# Patient Record
Sex: FEMALE | Race: BLACK OR AFRICAN AMERICAN | Employment: FULL TIME | URBAN - METROPOLITAN AREA
[De-identification: names, ages, dates, MRNs, and addresses within clinical notes are randomized per-mention and may not be internally consistent; named-entity substitution may affect disease eponyms.]

---

## 2017-07-06 ENCOUNTER — OFFICE VISIT (OUTPATIENT)
Dept: FAMILY MEDICINE CLINIC | Facility: CLINIC | Age: 32
End: 2017-07-06

## 2017-07-06 VITALS
TEMPERATURE: 98 F | DIASTOLIC BLOOD PRESSURE: 78 MMHG | HEIGHT: 61 IN | HEART RATE: 89 BPM | BODY MASS INDEX: 45.12 KG/M2 | WEIGHT: 239 LBS | SYSTOLIC BLOOD PRESSURE: 115 MMHG

## 2017-07-06 DIAGNOSIS — E66.01 MORBID OBESITY WITH BMI OF 45.0-49.9, ADULT (HCC): ICD-10-CM

## 2017-07-06 DIAGNOSIS — Z00.00 WELL ADULT EXAM: Primary | ICD-10-CM

## 2017-07-06 PROCEDURE — 99385 PREV VISIT NEW AGE 18-39: CPT | Performed by: FAMILY MEDICINE

## 2017-07-06 NOTE — PROGRESS NOTES
HPI:   Ferman Saint is a 34year old female who presents for a complete physical exam. Symptoms: periods are regular. Wt Readings from Last 6 Encounters:  07/06/17 : 239 lb (108.4 kg)    Body mass index is 45.16 kg/m².      No results found for: CHOLEST, 98.3 °F (36.8 °C) (Oral)   Ht 5' 1\" (1.549 m)   Wt 239 lb (108.4 kg)   LMP 06/14/2017 (Exact Date)   BMI 45.16 kg/m²   Body mass index is 45.16 kg/m².    GENERAL: well developed, well nourished,in no apparent distress  SKIN: no rashes,no suspicious lesions content. Recommend exercising at least 30-40 minutes 5-6 days a week. Avoid skipping meals. Making healthy choices for snacks and also limiting sugary beverages.      - HEMOGLOBIN A1C; Future  - DIETITIAN EDUCATION INITIAL, DIET (INTERNAL)      Kenney DACOSTA

## 2017-07-11 ENCOUNTER — LAB ENCOUNTER (OUTPATIENT)
Dept: LAB | Age: 32
End: 2017-07-11
Attending: FAMILY MEDICINE
Payer: MEDICAID

## 2017-07-11 DIAGNOSIS — Z00.00 WELL ADULT EXAM: ICD-10-CM

## 2017-07-11 DIAGNOSIS — E66.01 MORBID OBESITY WITH BMI OF 45.0-49.9, ADULT (HCC): ICD-10-CM

## 2017-07-11 LAB
ALT SERPL-CCNC: 12 U/L (ref 14–54)
ANION GAP SERPL CALC-SCNC: 5 MMOL/L (ref 0–18)
AST SERPL-CCNC: 16 U/L (ref 15–41)
BASOPHILS # BLD: 0.1 K/UL (ref 0–0.2)
BASOPHILS NFR BLD: 1 %
BUN SERPL-MCNC: 10 MG/DL (ref 8–20)
BUN/CREAT SERPL: 13.7 (ref 10–20)
CALCIUM SERPL-MCNC: 9 MG/DL (ref 8.5–10.5)
CHLORIDE SERPL-SCNC: 107 MMOL/L (ref 95–110)
CHOLEST SERPL-MCNC: 118 MG/DL (ref 110–200)
CO2 SERPL-SCNC: 26 MMOL/L (ref 22–32)
CREAT SERPL-MCNC: 0.73 MG/DL (ref 0.5–1.5)
EOSINOPHIL # BLD: 0.2 K/UL (ref 0–0.7)
EOSINOPHIL NFR BLD: 4 %
ERYTHROCYTE [DISTWIDTH] IN BLOOD BY AUTOMATED COUNT: 14.5 % (ref 11–15)
GLUCOSE SERPL-MCNC: 93 MG/DL (ref 70–99)
HBA1C MFR BLD: 5.3 % (ref 4–6)
HCT VFR BLD AUTO: 40.8 % (ref 35–48)
HDLC SERPL-MCNC: 37 MG/DL
HGB BLD-MCNC: 13.6 G/DL (ref 12–16)
LDLC SERPL CALC-MCNC: 72 MG/DL (ref 0–99)
LYMPHOCYTES # BLD: 1.9 K/UL (ref 1–4)
LYMPHOCYTES NFR BLD: 34 %
MCH RBC QN AUTO: 30.6 PG (ref 27–32)
MCHC RBC AUTO-ENTMCNC: 33.4 G/DL (ref 32–37)
MCV RBC AUTO: 91.6 FL (ref 80–100)
MONOCYTES # BLD: 0.4 K/UL (ref 0–1)
MONOCYTES NFR BLD: 8 %
NEUTROPHILS # BLD AUTO: 3 K/UL (ref 1.8–7.7)
NEUTROPHILS NFR BLD: 53 %
NONHDLC SERPL-MCNC: 81 MG/DL
OSMOLALITY UR CALC.SUM OF ELEC: 285 MOSM/KG (ref 275–295)
PLATELET # BLD AUTO: 243 K/UL (ref 140–400)
PMV BLD AUTO: 10.5 FL (ref 7.4–10.3)
POTASSIUM SERPL-SCNC: 4.3 MMOL/L (ref 3.3–5.1)
RBC # BLD AUTO: 4.46 M/UL (ref 3.7–5.4)
SODIUM SERPL-SCNC: 138 MMOL/L (ref 136–144)
TRIGL SERPL-MCNC: 44 MG/DL (ref 1–149)
TSH SERPL-ACNC: 1.55 UIU/ML (ref 0.45–5.33)
WBC # BLD AUTO: 5.6 K/UL (ref 4–11)

## 2017-07-11 PROCEDURE — 84450 TRANSFERASE (AST) (SGOT): CPT

## 2017-07-11 PROCEDURE — 85025 COMPLETE CBC W/AUTO DIFF WBC: CPT

## 2017-07-11 PROCEDURE — 82306 VITAMIN D 25 HYDROXY: CPT

## 2017-07-11 PROCEDURE — 80061 LIPID PANEL: CPT

## 2017-07-11 PROCEDURE — 36415 COLL VENOUS BLD VENIPUNCTURE: CPT

## 2017-07-11 PROCEDURE — 84443 ASSAY THYROID STIM HORMONE: CPT

## 2017-07-11 PROCEDURE — 84460 ALANINE AMINO (ALT) (SGPT): CPT

## 2017-07-11 PROCEDURE — 83036 HEMOGLOBIN GLYCOSYLATED A1C: CPT

## 2017-07-11 PROCEDURE — 80048 BASIC METABOLIC PNL TOTAL CA: CPT

## 2017-07-12 LAB — 25(OH)D3 SERPL-MCNC: 14.4 NG/ML

## 2017-07-21 RX ORDER — ERGOCALCIFEROL 1.25 MG/1
50000 CAPSULE ORAL WEEKLY
Qty: 12 CAPSULE | Refills: 0 | Status: SHIPPED | OUTPATIENT
Start: 2017-07-21 | End: 2017-10-07

## 2019-04-07 ENCOUNTER — HOSPITAL ENCOUNTER (OUTPATIENT)
Age: 34
Discharge: HOME | End: 2019-04-07
Attending: FAMILY MEDICINE
Payer: MEDICAID

## 2019-04-07 VITALS
OXYGEN SATURATION: 100 % | HEART RATE: 81 BPM | BODY MASS INDEX: 43 KG/M2 | TEMPERATURE: 99 F | RESPIRATION RATE: 16 BRPM | WEIGHT: 230 LBS | SYSTOLIC BLOOD PRESSURE: 132 MMHG | DIASTOLIC BLOOD PRESSURE: 81 MMHG

## 2019-04-07 DIAGNOSIS — J02.0 STREPTOCOCCAL SORE THROAT: Primary | ICD-10-CM

## 2019-04-07 PROCEDURE — 99204 OFFICE O/P NEW MOD 45 MIN: CPT

## 2019-04-07 PROCEDURE — 87430 STREP A AG IA: CPT

## 2019-04-07 PROCEDURE — 99213 OFFICE O/P EST LOW 20 MIN: CPT

## 2019-04-07 RX ORDER — IBUPROFEN 800 MG/1
800 TABLET ORAL EVERY 8 HOURS PRN
Qty: 30 TABLET | Refills: 0 | Status: SHIPPED | OUTPATIENT
Start: 2019-04-07 | End: 2019-04-14

## 2019-04-07 RX ORDER — AMOXICILLIN 875 MG/1
875 TABLET, COATED ORAL 2 TIMES DAILY
Qty: 20 TABLET | Refills: 0 | Status: SHIPPED | OUTPATIENT
Start: 2019-04-07 | End: 2019-04-17

## 2019-04-07 NOTE — ED PROVIDER NOTES
Patient presents with:  Ear Problem Pain (neurosensory)      HPI:     Shleby Alcocer is a 27year old female who presents with for chief complaint of nasal congestion, sore throat, fatigue, left ear pain  X 2 days.     The patient denies complaints of reactive to light. No scleral icterus. Neck: Normal range of motion. Neck supple. No JVD present. No tracheal deviation. Mild anterior cervical adenopathy present. No thyromegaly present.    Cardiovascular: Normal rate, regular rhythm and intact distal instructions for your condition today.     Follow Up with:  Usman Le, [facility] [address]  [phone]    In 1 week  IF NOT BETTER

## 2020-01-06 NOTE — ADDENDUM NOTE
Addended by: Genna Owens on: 7/21/2017 03:19 PM     Modules accepted: Orders No pallor, no cervical/supraclavicular/inguinal adenopathy.  No splenomegaly

## 2020-04-15 ENCOUNTER — TELEPHONE (OUTPATIENT)
Dept: FAMILY MEDICINE CLINIC | Facility: CLINIC | Age: 35
End: 2020-04-15

## 2020-04-15 NOTE — TELEPHONE ENCOUNTER
Flu yes annually but not the pneumonia vaccine, that's for people over 72 or younger than 72 only if they have heart disease or other diabetic or have any lung issues such as COPD

## 2020-04-15 NOTE — TELEPHONE ENCOUNTER
Dr. Gila Daly, [facility] patient is due for Pneumococcal PPSV23 Medium Risk Adult and Influenza. Please advise.

## 2020-04-15 NOTE — TELEPHONE ENCOUNTER
----- Message from Irineo Alcocer sent at 4/15/2020  3:20 PM CDT -----  Regarding: Non-Urgent Medical Question  Contact: [phone]  Could you look in my to do list and tell me what it this vaccine that it is saying I'm over do for because I don't know

## 2020-04-16 NOTE — TELEPHONE ENCOUNTER
Autumn Alcocer Robe  You 30 minutes ago (9:44 AM)         Sharmila Edwards thank you it had got me kind of scared because I didn't know what it was.

## 2020-04-21 ENCOUNTER — NURSE TRIAGE (OUTPATIENT)
Dept: FAMILY MEDICINE CLINIC | Facility: CLINIC | Age: 35
End: 2020-04-21

## 2020-04-21 NOTE — TELEPHONE ENCOUNTER
Action Requested: Summary for Provider     []  Critical Lab, Recommendations Needed  [] Need Additional Advice  [x]   FYI    []   Need Orders  [] Need Medications Sent to Pharmacy  []  Other     SUMMARY: per patient she had a \"small headache\" and she rudolph

## 2020-04-21 NOTE — TELEPHONE ENCOUNTER
Please patient please place patient on my schedule for a telemetry visit ( video/ audio) to review blood pressure next week. In the meantime have patient check her blood pressures on a daily basis. Maintain a low-sodium diet.  1500 mg daily total

## 2020-04-25 ENCOUNTER — PATIENT MESSAGE (OUTPATIENT)
Dept: FAMILY MEDICINE CLINIC | Facility: CLINIC | Age: 35
End: 2020-04-25

## 2020-04-26 ENCOUNTER — TELEPHONE (OUTPATIENT)
Dept: FAMILY MEDICINE CLINIC | Facility: CLINIC | Age: 35
End: 2020-04-26

## 2020-04-26 NOTE — TELEPHONE ENCOUNTER
From: Karen Alcocer  To: 59 Axel Blood MD  Sent: 4/25/2020  1:33 PM CDT  Subject: Other    Hello I woke up this morning feeling very dizzy and that was at 8 am and now it's 1:30 this afternoon and I'm still dizzy my blood pressure was 136/98 at 10am

## 2020-04-26 NOTE — TELEPHONE ENCOUNTER
RN pls call pt and triage, thanks.      Future Appointments   Date Time Provider Juan M Brenner   4/29/2020 11:00 AM Maryana Gifford MD [facility]

## 2020-04-26 NOTE — TELEPHONE ENCOUNTER
From: Leah Alcocer  To: 59 Axel Blood MD  Sent: 4/25/2020 1:33 PM CDT  Subject: Other    Hello I woke up this morning feeling very dizzy and that was at 8 am and now it's 1:30 this afternoon and I'm still dizzy my blood pressure was 136/98 at 10am w

## 2020-04-27 NOTE — TELEPHONE ENCOUNTER
Future Appointments   Date Time Provider Juan M Brenner   4/29/2020 11:00 AM Leena Millan MD [facility] Teddy Pyle

## 2020-04-27 NOTE — TELEPHONE ENCOUNTER
Dr Rudy Reynaga responded in original MyChart encounter that had been routed to her:    Ceci Islas MD  Em Rn Triage 4 hours ago (8:22 AM)         Please schedule virtual visit   Please have her check bps daily         Documentation      RN please contact pt reg

## 2020-04-29 ENCOUNTER — TELEMEDICINE (OUTPATIENT)
Dept: FAMILY MEDICINE CLINIC | Facility: CLINIC | Age: 35
End: 2020-04-29
Payer: MEDICAID

## 2020-04-29 VITALS
HEART RATE: 87 BPM | TEMPERATURE: 98 F | HEIGHT: 61 IN | WEIGHT: 259 LBS | BODY MASS INDEX: 48.9 KG/M2 | DIASTOLIC BLOOD PRESSURE: 89 MMHG | SYSTOLIC BLOOD PRESSURE: 112 MMHG

## 2020-04-29 DIAGNOSIS — R03.0 ELEVATED BLOOD PRESSURE READING: Primary | ICD-10-CM

## 2020-04-29 DIAGNOSIS — E66.01 MORBID OBESITY WITH BMI OF 45.0-49.9, ADULT (HCC): ICD-10-CM

## 2020-04-29 DIAGNOSIS — R42 DIZZINESS: ICD-10-CM

## 2020-04-29 PROCEDURE — 99214 OFFICE O/P EST MOD 30 MIN: CPT | Performed by: FAMILY MEDICINE

## 2020-04-29 NOTE — PROGRESS NOTES
This visit is conducted using Telemedicine with live, interactive video and audio during this Coronavirus pandemic. Please note that the following visit was completed using two-way, real-time interactive audio and/or video communication.   This has been arms/ legs        ROS  A comprehensive 10 point review of systems was completed. Pertinent positives and negatives noted in the the HPI. PATIENTS DENIES ANY KNOWN EXPOSURE TO ANYONE CONFIRMED FOR COVID 19. History reviewed.  No pertinent past m weeks  - COMP METABOLIC PANEL (14); Future  - LIPID PANEL; Future  - HEMOGLOBIN A1C; Future    3. Dizziness  Resolved at this time. Check blood work. If symptoms recur please call back. Patient verbalized intending of plan.   Recommend increasing water i

## 2020-04-30 ENCOUNTER — LAB ENCOUNTER (OUTPATIENT)
Dept: LAB | Age: 35
End: 2020-04-30
Attending: FAMILY MEDICINE
Payer: MEDICAID

## 2020-04-30 DIAGNOSIS — R42 DIZZINESS: ICD-10-CM

## 2020-04-30 DIAGNOSIS — E66.01 MORBID OBESITY WITH BMI OF 45.0-49.9, ADULT (HCC): ICD-10-CM

## 2020-04-30 PROCEDURE — 80053 COMPREHEN METABOLIC PANEL: CPT

## 2020-04-30 PROCEDURE — 83036 HEMOGLOBIN GLYCOSYLATED A1C: CPT

## 2020-04-30 PROCEDURE — 85025 COMPLETE CBC W/AUTO DIFF WBC: CPT

## 2020-04-30 PROCEDURE — 36415 COLL VENOUS BLD VENIPUNCTURE: CPT

## 2020-04-30 PROCEDURE — 80061 LIPID PANEL: CPT

## 2020-05-06 ENCOUNTER — APPOINTMENT (OUTPATIENT)
Dept: LAB | Age: 35
End: 2020-05-06
Attending: FAMILY MEDICINE
Payer: MEDICAID

## 2020-05-06 ENCOUNTER — OFFICE VISIT (OUTPATIENT)
Dept: FAMILY MEDICINE CLINIC | Facility: CLINIC | Age: 35
End: 2020-05-06
Payer: MEDICAID

## 2020-05-06 VITALS
WEIGHT: 254 LBS | HEART RATE: 72 BPM | RESPIRATION RATE: 16 BRPM | TEMPERATURE: 99 F | BODY MASS INDEX: 47.95 KG/M2 | SYSTOLIC BLOOD PRESSURE: 128 MMHG | DIASTOLIC BLOOD PRESSURE: 70 MMHG | HEIGHT: 61 IN

## 2020-05-06 DIAGNOSIS — E66.01 MORBID OBESITY WITH BMI OF 45.0-49.9, ADULT (HCC): Primary | ICD-10-CM

## 2020-05-06 DIAGNOSIS — R03.0 ELEVATED BLOOD PRESSURE READING: ICD-10-CM

## 2020-05-06 DIAGNOSIS — Z00.00 WELL ADULT EXAM: ICD-10-CM

## 2020-05-06 PROCEDURE — 99214 OFFICE O/P EST MOD 30 MIN: CPT | Performed by: FAMILY MEDICINE

## 2020-05-06 PROCEDURE — 36415 COLL VENOUS BLD VENIPUNCTURE: CPT

## 2020-05-06 PROCEDURE — 84443 ASSAY THYROID STIM HORMONE: CPT

## 2020-05-06 PROCEDURE — 82306 VITAMIN D 25 HYDROXY: CPT

## 2020-05-06 NOTE — PROGRESS NOTES
HPI:    Patient ID: Shad Alarcon is a 38year old female.     HPI  Patient presents with:  Hypertension: Patient present for BP check    Taking bps at home  Readings at home 122-128/70-82    No chest pain, shortness of breath  Gets slightly dizzy in ASSESSMENT/PLAN:   Morbid obesity with bmi of 45.0-49.9, adult (hcc)  (primary encounter diagnosis)  Elevated blood pressure reading  Well adult exam    1. Morbid obesity with BMI of 45.0-49.9, adult ([address]  Highly recommend to lose weight.   Butch Crest

## 2020-06-29 ENCOUNTER — OFFICE VISIT (OUTPATIENT)
Dept: FAMILY MEDICINE CLINIC | Facility: CLINIC | Age: 35
End: 2020-06-29
Payer: MEDICAID

## 2020-06-29 VITALS
DIASTOLIC BLOOD PRESSURE: 82 MMHG | TEMPERATURE: 100 F | HEART RATE: 82 BPM | SYSTOLIC BLOOD PRESSURE: 119 MMHG | BODY MASS INDEX: 47.39 KG/M2 | HEIGHT: 61 IN | WEIGHT: 251 LBS

## 2020-06-29 DIAGNOSIS — F17.210 LIGHT CIGARETTE SMOKER: ICD-10-CM

## 2020-06-29 DIAGNOSIS — E55.9 VITAMIN D DEFICIENCY: ICD-10-CM

## 2020-06-29 DIAGNOSIS — R74.8 ELEVATED ALKALINE PHOSPHATASE LEVEL: ICD-10-CM

## 2020-06-29 DIAGNOSIS — E66.01 MORBID OBESITY WITH BMI OF 45.0-49.9, ADULT (HCC): ICD-10-CM

## 2020-06-29 DIAGNOSIS — F12.90 MARIJUANA USE: ICD-10-CM

## 2020-06-29 DIAGNOSIS — Z00.00 WELL ADULT EXAM: Primary | ICD-10-CM

## 2020-06-29 DIAGNOSIS — Z01.419 ENCOUNTER FOR GYNECOLOGICAL EXAMINATION: ICD-10-CM

## 2020-06-29 PROCEDURE — 99395 PREV VISIT EST AGE 18-39: CPT | Performed by: FAMILY MEDICINE

## 2020-07-02 LAB — HPV I/H RISK 1 DNA SPEC QL NAA+PROBE: NEGATIVE

## 2020-07-03 LAB
LAST PAP RESULT: NORMAL
PAP HISTORY (OTHER THAN LAST PAP): NORMAL

## 2020-10-12 ENCOUNTER — OFFICE VISIT (OUTPATIENT)
Dept: FAMILY MEDICINE CLINIC | Facility: CLINIC | Age: 35
End: 2020-10-12
Payer: MEDICAID

## 2020-10-12 ENCOUNTER — TELEPHONE (OUTPATIENT)
Dept: OBGYN CLINIC | Facility: CLINIC | Age: 35
End: 2020-10-12

## 2020-10-12 VITALS
TEMPERATURE: 98 F | HEART RATE: 96 BPM | WEIGHT: 254 LBS | SYSTOLIC BLOOD PRESSURE: 120 MMHG | DIASTOLIC BLOOD PRESSURE: 76 MMHG | HEIGHT: 61 IN | BODY MASS INDEX: 47.95 KG/M2

## 2020-10-12 DIAGNOSIS — N91.2 AMENORRHEA: Primary | ICD-10-CM

## 2020-10-12 DIAGNOSIS — Z32.01 PREGNANCY EXAMINATION OR TEST, POSITIVE RESULT: ICD-10-CM

## 2020-10-12 PROCEDURE — 81025 URINE PREGNANCY TEST: CPT | Performed by: FAMILY MEDICINE

## 2020-10-12 PROCEDURE — 3008F BODY MASS INDEX DOCD: CPT | Performed by: FAMILY MEDICINE

## 2020-10-12 PROCEDURE — 3074F SYST BP LT 130 MM HG: CPT | Performed by: FAMILY MEDICINE

## 2020-10-12 PROCEDURE — 99213 OFFICE O/P EST LOW 20 MIN: CPT | Performed by: FAMILY MEDICINE

## 2020-10-12 PROCEDURE — 3078F DIAST BP <80 MM HG: CPT | Performed by: FAMILY MEDICINE

## 2020-10-12 NOTE — PROGRESS NOTES
HPI:    Patient ID: Shad Alarcon is a 38year old female. HPI  Patient presents with:  Pregnancy: pt states she took a test at home and was positive     Patient states she cleared her menstrual cycle.   She did a home pregnancy test at home 2 days positive result  Recommend starting prenatal vitamins.   Follow-up with OB GYN.  - OBG - INTERNAL      Orders Placed This Encounter      POC Urine pregnancy test [05162]      Meds This Visit:  Requested Prescriptions      No prescriptions requested or order

## 2020-10-12 NOTE — TELEPHONE ENCOUNTER
Confirms LMP 9/9. 4w5d confirms regular cycles every 28 days lasting for 5 days. Advised to start PNV with DHA and FA right away. Advised of rotating male and female practice. Advised first appointment is with nurse OBN.  Pt is comfortable coming in for a O

## 2020-11-02 ENCOUNTER — NURSE ONLY (OUTPATIENT)
Dept: OBGYN CLINIC | Facility: CLINIC | Age: 35
End: 2020-11-02
Payer: MEDICAID

## 2020-11-02 VITALS — HEIGHT: 61 IN | WEIGHT: 250 LBS | BODY MASS INDEX: 47.2 KG/M2

## 2020-11-02 DIAGNOSIS — Z34.81 ENCOUNTER FOR SUPERVISION OF OTHER NORMAL PREGNANCY IN FIRST TRIMESTER: Primary | ICD-10-CM

## 2020-11-02 PROCEDURE — 3008F BODY MASS INDEX DOCD: CPT | Performed by: OBSTETRICS & GYNECOLOGY

## 2020-11-02 NOTE — PROGRESS NOTES
Pt had OBN appt today with no complaints. Normal PN labs ordered plus 1 hr gtt, sickle cell, and hep c. Pt advised all labs must be completed and resulted prior to MD appt. Pt aware she needs appt for lab work. Pt accepted new ob appt with JENARO on 11/16. Yes-childhood     Recent Travel (or planned travel) to [facility] for self and or partner No    Pets No        GENETICS SCREENING    Genetic Screening    Genetic Screening/Teratology Counseling- Includes patient, baby's father, or anyone in either family wi

## 2020-11-04 ENCOUNTER — LAB ENCOUNTER (OUTPATIENT)
Dept: LAB | Facility: HOSPITAL | Age: 35
End: 2020-11-04
Attending: OBSTETRICS & GYNECOLOGY
Payer: MEDICAID

## 2020-11-04 DIAGNOSIS — R74.8 ELEVATED ALKALINE PHOSPHATASE LEVEL: ICD-10-CM

## 2020-11-04 DIAGNOSIS — Z34.81 ENCOUNTER FOR SUPERVISION OF OTHER NORMAL PREGNANCY IN FIRST TRIMESTER: ICD-10-CM

## 2020-11-04 PROCEDURE — 86780 TREPONEMA PALLIDUM: CPT

## 2020-11-04 PROCEDURE — 87389 HIV-1 AG W/HIV-1&-2 AB AG IA: CPT

## 2020-11-04 PROCEDURE — 80076 HEPATIC FUNCTION PANEL: CPT

## 2020-11-04 PROCEDURE — 85660 RBC SICKLE CELL TEST: CPT

## 2020-11-04 PROCEDURE — 86850 RBC ANTIBODY SCREEN: CPT

## 2020-11-04 PROCEDURE — 86803 HEPATITIS C AB TEST: CPT

## 2020-11-04 PROCEDURE — 85025 COMPLETE CBC W/AUTO DIFF WBC: CPT

## 2020-11-04 PROCEDURE — 36415 COLL VENOUS BLD VENIPUNCTURE: CPT

## 2020-11-04 PROCEDURE — 82950 GLUCOSE TEST: CPT

## 2020-11-04 PROCEDURE — 87086 URINE CULTURE/COLONY COUNT: CPT

## 2020-11-04 PROCEDURE — 87340 HEPATITIS B SURFACE AG IA: CPT

## 2020-11-04 PROCEDURE — 86762 RUBELLA ANTIBODY: CPT

## 2020-11-04 PROCEDURE — 86901 BLOOD TYPING SEROLOGIC RH(D): CPT

## 2020-11-04 PROCEDURE — 86900 BLOOD TYPING SEROLOGIC ABO: CPT

## 2020-11-16 ENCOUNTER — TELEPHONE (OUTPATIENT)
Dept: OBGYN CLINIC | Facility: CLINIC | Age: 35
End: 2020-11-16

## 2020-11-16 ENCOUNTER — INITIAL PRENATAL (OUTPATIENT)
Dept: OBGYN CLINIC | Facility: CLINIC | Age: 35
End: 2020-11-16
Payer: MEDICAID

## 2020-11-16 VITALS
WEIGHT: 252.63 LBS | DIASTOLIC BLOOD PRESSURE: 89 MMHG | SYSTOLIC BLOOD PRESSURE: 142 MMHG | BODY MASS INDEX: 48 KG/M2 | HEART RATE: 85 BPM

## 2020-11-16 DIAGNOSIS — E66.01 MORBID OBESITY WITH BMI OF 45.0-49.9, ADULT (HCC): ICD-10-CM

## 2020-11-16 DIAGNOSIS — Z34.91 ENCOUNTER FOR SUPERVISION OF NORMAL PREGNANCY IN FIRST TRIMESTER, UNSPECIFIED GRAVIDITY: Primary | ICD-10-CM

## 2020-11-16 DIAGNOSIS — O09.521 AMA (ADVANCED MATERNAL AGE) MULTIGRAVIDA 35+, FIRST TRIMESTER: Primary | ICD-10-CM

## 2020-11-16 DIAGNOSIS — Z36.9 FIRST TRIMESTER SCREENING: ICD-10-CM

## 2020-11-16 PROBLEM — Z13.79 GENETIC TESTING: Status: ACTIVE | Noted: 2020-11-16

## 2020-11-16 PROBLEM — O26.899 RH NEGATIVE STATE IN ANTEPARTUM PERIOD: Status: ACTIVE | Noted: 2020-11-16

## 2020-11-16 PROBLEM — Z67.91 RH NEGATIVE STATE IN ANTEPARTUM PERIOD: Status: ACTIVE | Noted: 2020-11-16

## 2020-11-16 PROBLEM — O26.899 RH NEGATIVE STATE IN ANTEPARTUM PERIOD (HCC): Status: ACTIVE | Noted: 2020-11-16

## 2020-11-16 PROBLEM — Z67.91 RH NEGATIVE STATE IN ANTEPARTUM PERIOD (HCC): Status: ACTIVE | Noted: 2020-11-16

## 2020-11-16 PROCEDURE — 87491 CHLMYD TRACH DNA AMP PROBE: CPT | Performed by: OBSTETRICS & GYNECOLOGY

## 2020-11-16 PROCEDURE — 81002 URINALYSIS NONAUTO W/O SCOPE: CPT | Performed by: OBSTETRICS & GYNECOLOGY

## 2020-11-16 PROCEDURE — 0500F INITIAL PRENATAL CARE VISIT: CPT | Performed by: OBSTETRICS & GYNECOLOGY

## 2020-11-16 PROCEDURE — 76815 OB US LIMITED FETUS(S): CPT | Performed by: OBSTETRICS & GYNECOLOGY

## 2020-11-16 PROCEDURE — 3079F DIAST BP 80-89 MM HG: CPT | Performed by: OBSTETRICS & GYNECOLOGY

## 2020-11-16 PROCEDURE — 87591 N.GONORRHOEAE DNA AMP PROB: CPT | Performed by: OBSTETRICS & GYNECOLOGY

## 2020-11-16 PROCEDURE — 90471 IMMUNIZATION ADMIN: CPT | Performed by: OBSTETRICS & GYNECOLOGY

## 2020-11-16 PROCEDURE — 90686 IIV4 VACC NO PRSV 0.5 ML IM: CPT | Performed by: OBSTETRICS & GYNECOLOGY

## 2020-11-16 PROCEDURE — 87661 TRICHOMONAS VAGINALIS AMPLIF: CPT | Performed by: OBSTETRICS & GYNECOLOGY

## 2020-11-16 PROCEDURE — 3077F SYST BP >= 140 MM HG: CPT | Performed by: OBSTETRICS & GYNECOLOGY

## 2020-11-16 NOTE — TELEPHONE ENCOUNTER
Wants genetics with MFM. Will need level 2, serial growth US, and NSTs for morbid obesity.   Also needs sleep study and maternal echocardiogram due to obesity

## 2020-11-16 NOTE — PROGRESS NOTES
New OB. Mild morning sickness resolves with breakfast.  No complaints. Wants genetics and flu shot. Repeat /80. Has BP cuff and asked to keep log and send weekly via nodila. Discussed need for M involvement due AMA and Morbid obesity.   Will n

## 2020-11-16 NOTE — TELEPHONE ENCOUNTER
Orders placed for M, stephy sent MFM number provided.      CPT 42980 has been approved  [id number]      Will route to RN pool to place order for maternal Echo and sleep study

## 2020-11-19 NOTE — PROGRESS NOTES
Outpatient Maternal-Fetal Medicine Consultation    Dear Dr. Dallas Mcgovern,    Thank you for requesting ultrasound evaluation and maternal fetal medicine consultation on your patient Andrew Alcocer.   As you are aware she is a 38year old female with a Single Comments: None    Past Medical History  The patient  has a past medical history of History of trichomoniasis, Marijuana use, Obesity, Class III, BMI 40-49.9 (morbid obesity) ([facility] [address] (2020), and Smoker.     Past Surgical History  The patient  has a past surgic diabetes  · Intrauterine fetal death    As a result, enhanced pregnancy surveillance is advised for these patients including a comprehensive ultrasound to assess for fetal malformations (at 20 weeks) and a third trimester ultrasound assessment for fetal gr death from 5.2 to 1.3 per 1000 pregnancies.  While a policy of antepartum testing in older women does increase the chance that a women will be induced (71 inductions per fetal death averted) and thereby increases her risk of having a  delivery, only of detailed mid-trimester sonography was reviewed with the patient. First trimester screening and second trimester multiple-marker serum serum screening as alternative aneuploidy screening options were also reviewed.  However, both of these tests are associ before or during pregnancy contribute to an increased probability of  delivery. It has also been hypothesized that obesity may lead to dystocia due to increased soft tissue deposition in the maternal pelvis.     delivery in the obese gravid per week, keeping HR<140 bpm.  I encouraged Ag Montez to begin moderate exercise such as walking or stationary bike in the pregnancy. She reports that she does snore. I advised that she get a sleep study.   She has been given that advice from Dr. Jennifer Mahmood

## 2020-12-03 ENCOUNTER — HOSPITAL ENCOUNTER (OUTPATIENT)
Dept: PERINATAL CARE | Facility: HOSPITAL | Age: 35
Discharge: HOME | End: 2020-12-03
Attending: OBSTETRICS & GYNECOLOGY
Payer: MEDICAID

## 2020-12-03 ENCOUNTER — TELEPHONE (OUTPATIENT)
Dept: PERINATAL CARE | Facility: HOSPITAL | Age: 35
End: 2020-12-03

## 2020-12-03 VITALS
BODY MASS INDEX: 48 KG/M2 | HEART RATE: 100 BPM | SYSTOLIC BLOOD PRESSURE: 116 MMHG | DIASTOLIC BLOOD PRESSURE: 67 MMHG | WEIGHT: 254 LBS

## 2020-12-03 DIAGNOSIS — O09.521 MULTIGRAVIDA OF ADVANCED MATERNAL AGE IN FIRST TRIMESTER: ICD-10-CM

## 2020-12-03 DIAGNOSIS — O99.211 OBESITY AFFECTING PREGNANCY IN FIRST TRIMESTER: ICD-10-CM

## 2020-12-03 DIAGNOSIS — E66.01 MORBID OBESITY WITH BMI OF 45.0-49.9, ADULT (HCC): ICD-10-CM

## 2020-12-03 DIAGNOSIS — Z36.9 FIRST TRIMESTER SCREENING: ICD-10-CM

## 2020-12-03 DIAGNOSIS — Z36.9 FIRST TRIMESTER SCREENING: Primary | ICD-10-CM

## 2020-12-03 PROBLEM — E66.813 OBESITY, CLASS III, BMI 40-49.9 (MORBID OBESITY): Status: ACTIVE | Noted: 2020-01-01

## 2020-12-03 PROCEDURE — 76813 OB US NUCHAL MEAS 1 GEST: CPT | Performed by: OBSTETRICS & GYNECOLOGY

## 2020-12-03 PROCEDURE — 99244 OFF/OP CNSLTJ NEW/EST MOD 40: CPT | Performed by: OBSTETRICS & GYNECOLOGY

## 2020-12-10 ENCOUNTER — TELEPHONE (OUTPATIENT)
Dept: OBGYN CLINIC | Facility: CLINIC | Age: 35
End: 2020-12-10

## 2020-12-10 ENCOUNTER — TELEPHONE (OUTPATIENT)
Dept: PERINATAL CARE | Facility: HOSPITAL | Age: 35
End: 2020-12-10

## 2020-12-10 NOTE — TELEPHONE ENCOUNTER
HARMONY screening results obt  Reviewed by MD Haritha Bach  Low risk for  Trisomy 21  1:[id number] risk  Low risk for Trisomy 18/13  1:30731 risk    Fetal sex: Held for     Pt states understanding  Copy of results sent for scanning into pt record

## 2020-12-10 NOTE — TELEPHONE ENCOUNTER
12/03/20 [place] results given to [address] placed on orange folder and brown according  folder (MA 's office) copy.

## 2020-12-11 ENCOUNTER — TELEPHONE (OUTPATIENT)
Dept: OBGYN CLINIC | Facility: CLINIC | Age: 35
End: 2020-12-11

## 2020-12-14 ENCOUNTER — ROUTINE PRENATAL (OUTPATIENT)
Dept: OBGYN CLINIC | Facility: CLINIC | Age: 35
End: 2020-12-14
Payer: MEDICAID

## 2020-12-14 ENCOUNTER — TELEPHONE (OUTPATIENT)
Dept: OBGYN CLINIC | Facility: CLINIC | Age: 35
End: 2020-12-14

## 2020-12-14 VITALS
DIASTOLIC BLOOD PRESSURE: 83 MMHG | BODY MASS INDEX: 48 KG/M2 | HEART RATE: 88 BPM | WEIGHT: 252 LBS | SYSTOLIC BLOOD PRESSURE: 127 MMHG

## 2020-12-14 DIAGNOSIS — Z34.81 ENCOUNTER FOR SUPERVISION OF OTHER NORMAL PREGNANCY IN FIRST TRIMESTER: Primary | ICD-10-CM

## 2020-12-14 DIAGNOSIS — Z34.82 ENCOUNTER FOR SUPERVISION OF OTHER NORMAL PREGNANCY IN SECOND TRIMESTER: Primary | ICD-10-CM

## 2020-12-14 DIAGNOSIS — O99.212 MATERNAL MORBID OBESITY IN SECOND TRIMESTER, ANTEPARTUM (HCC): ICD-10-CM

## 2020-12-14 DIAGNOSIS — E66.01 MATERNAL MORBID OBESITY IN SECOND TRIMESTER, ANTEPARTUM (HCC): ICD-10-CM

## 2020-12-14 PROCEDURE — 3079F DIAST BP 80-89 MM HG: CPT | Performed by: OBSTETRICS & GYNECOLOGY

## 2020-12-14 PROCEDURE — 0502F SUBSEQUENT PRENATAL CARE: CPT | Performed by: OBSTETRICS & GYNECOLOGY

## 2020-12-14 PROCEDURE — 3074F SYST BP LT 130 MM HG: CPT | Performed by: OBSTETRICS & GYNECOLOGY

## 2020-12-14 PROCEDURE — 81002 URINALYSIS NONAUTO W/O SCOPE: CPT | Performed by: OBSTETRICS & GYNECOLOGY

## 2020-12-26 ENCOUNTER — APPOINTMENT (OUTPATIENT)
Dept: ULTRASOUND IMAGING | Facility: HOSPITAL | Age: 35
End: 2020-12-26
Attending: NURSE PRACTITIONER
Payer: MEDICAID

## 2020-12-26 ENCOUNTER — HOSPITAL ENCOUNTER (EMERGENCY)
Facility: HOSPITAL | Age: 35
Discharge: HOME | End: 2020-12-26
Payer: MEDICAID

## 2020-12-26 VITALS
OXYGEN SATURATION: 98 % | DIASTOLIC BLOOD PRESSURE: 82 MMHG | HEIGHT: 62 IN | TEMPERATURE: 98 F | BODY MASS INDEX: 46.01 KG/M2 | WEIGHT: 250 LBS | HEART RATE: 83 BPM | SYSTOLIC BLOOD PRESSURE: 114 MMHG | RESPIRATION RATE: 18 BRPM

## 2020-12-26 DIAGNOSIS — R10.2 PELVIC CRAMPING IN ANTEPARTUM PERIOD: ICD-10-CM
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
## 2020-12-26 DIAGNOSIS — O26.899 PELVIC CRAMPING IN ANTEPARTUM PERIOD: ICD-10-CM

## 2020-12-26 PROCEDURE — 81003 URINALYSIS AUTO W/O SCOPE: CPT | Performed by: NURSE PRACTITIONER

## 2020-12-26 PROCEDURE — 76815 OB US LIMITED FETUS(S): CPT | Performed by: NURSE PRACTITIONER

## 2020-12-26 PROCEDURE — 99284 EMERGENCY DEPT VISIT MOD MDM: CPT

## 2020-12-26 NOTE — ED PROVIDER NOTES
Patient Seen in: [facility] AND [facility] Emergency Department      History   Patient presents with:  Fall    Stated Complaint: fall     34yo/f with hx of obesity reports to the ED with complaints of a fall and pelvic pressure - 15 weeks pregnant.  Patient repor Musculoskeletal: Normal range of motion. General: No tenderness or deformity. Skin:     General: Skin is warm and dry. Capillary Refill: Capillary refill takes less than 2 seconds. Findings: No rash.       Comments: Normal color   Neur Disposition and Plan     Clinical Impression:  Fall, initial encounter  (primary encounter diagnosis)  Pelvic cramping in antepartum period    Disposition:  Discharge  12/26/2020  1:46 pm    Follow-up:  Zenaida Nolasco MD  [address]

## 2020-12-26 NOTE — ED NOTES
Kathi Pod reports she tripped at home around 0300 and fell onto her buttocks. She is alert, oriented, denies head injury. C/O \"pressure\" in left lower abdomen. She is currently about 16 weeks pregnant.  She has established prenatal care with [facility]

## 2020-12-26 NOTE — ED NOTES
At time of discharge, Florentino Olivas is alert, oriented, speech clear, respirations regular and nonlabored. She is able to dress herself and ambulates unassisted with steady gait.  She verbalizes understanding of discharge instructions including follow up recommend

## 2020-12-26 NOTE — ED INITIAL ASSESSMENT (HPI)
Pt reports slipped and tripped on swiffer and fell on to buttock last night, pt reports being 15 weeks pregnant and wants to get check because she has some abdominal pressure this am, denies vaginal bleeding or cramping

## 2021-01-14 ENCOUNTER — ROUTINE PRENATAL (OUTPATIENT)
Dept: OBGYN CLINIC | Facility: CLINIC | Age: 36
End: 2021-01-14
Payer: MEDICAID

## 2021-01-14 VITALS
WEIGHT: 252 LBS | SYSTOLIC BLOOD PRESSURE: 117 MMHG | HEART RATE: 99 BPM | DIASTOLIC BLOOD PRESSURE: 71 MMHG | BODY MASS INDEX: 46 KG/M2

## 2021-01-14 DIAGNOSIS — Z34.82 ENCOUNTER FOR SUPERVISION OF OTHER NORMAL PREGNANCY IN SECOND TRIMESTER: Primary | ICD-10-CM

## 2021-01-14 LAB
APPEARANCE: CLEAR
MULTISTIX LOT#: 5077 NUMERIC
PH, URINE: 6.5 (ref 4.5–8)
SPECIFIC GRAVITY: 1.02 (ref 1–1.03)
URINE-COLOR: YELLOW
UROBILINOGEN,SEMI-QN: 0.2 MG/DL (ref 0–1.9)

## 2021-01-14 PROCEDURE — 0502F SUBSEQUENT PRENATAL CARE: CPT | Performed by: OBSTETRICS & GYNECOLOGY

## 2021-01-14 PROCEDURE — 3074F SYST BP LT 130 MM HG: CPT | Performed by: OBSTETRICS & GYNECOLOGY

## 2021-01-14 PROCEDURE — 3078F DIAST BP <80 MM HG: CPT | Performed by: OBSTETRICS & GYNECOLOGY

## 2021-01-14 PROCEDURE — 81002 URINALYSIS NONAUTO W/O SCOPE: CPT | Performed by: OBSTETRICS & GYNECOLOGY

## 2021-01-21 NOTE — PROGRESS NOTES
Madison Yu     Dear Dr. Ksenia Martinez,     Thank you for requesting ultrasound evaluation and maternal fetal medicine consultation on your patient Cornelio Alcocer.   As you are aware she is a 45/40 year old female [id number] with a S limitations of ultrasound were discussed. See imaging tab for the complete US report.      DISCUSSION  During her visit we discussed and reviewed the following issues:    PYELECTASIS  Pyelectasis is defined as an abnormal dilation of the renal pelvis in aneuploidy was noted in 3.8% and undiagnosed cerebral anomalies were present in 4%. Other in utero undiagnosed malformations were present  8.6% and  death occurred in 3.7%.   Finally, abnormal development was noted in 11.5%.       Management of angeles been given that advice from Dr. Maria Ines Sher as well. I explained to her how untreated sleep apnea can affect ones weight, blood pressure as well as affect pregnancy outcomes with a higher rate of preeclampsia.   She said she would have a sleep study schedule M.D.     The majority of the time (>50%) was spent in review of records, consultation and coordination of care. Our discussion is summarized above. The approximate physician face-to-face time was 60  minutes.

## 2021-01-22 ENCOUNTER — HOSPITAL ENCOUNTER (OUTPATIENT)
Dept: CV DIAGNOSTICS | Facility: HOSPITAL | Age: 36
Discharge: HOME | End: 2021-01-22
Attending: OBSTETRICS & GYNECOLOGY
Payer: MEDICAID

## 2021-01-22 DIAGNOSIS — Z34.82 ENCOUNTER FOR SUPERVISION OF OTHER NORMAL PREGNANCY IN SECOND TRIMESTER: ICD-10-CM

## 2021-01-22 DIAGNOSIS — O99.212 MATERNAL MORBID OBESITY IN SECOND TRIMESTER, ANTEPARTUM (HCC): ICD-10-CM

## 2021-01-22 DIAGNOSIS — E66.01 MATERNAL MORBID OBESITY IN SECOND TRIMESTER, ANTEPARTUM (HCC): ICD-10-CM

## 2021-01-22 PROCEDURE — 93306 TTE W/DOPPLER COMPLETE: CPT | Performed by: OBSTETRICS & GYNECOLOGY

## 2021-01-28 ENCOUNTER — HOSPITAL ENCOUNTER (OUTPATIENT)
Dept: PERINATAL CARE | Facility: HOSPITAL | Age: 36
Discharge: HOME | End: 2021-01-28
Attending: OBSTETRICS & GYNECOLOGY
Payer: MEDICAID

## 2021-01-28 ENCOUNTER — TELEPHONE (OUTPATIENT)
Dept: OBGYN CLINIC | Facility: CLINIC | Age: 36
End: 2021-01-28

## 2021-01-28 ENCOUNTER — NURSE ONLY (OUTPATIENT)
Dept: OBGYN CLINIC | Facility: CLINIC | Age: 36
End: 2021-01-28
Payer: MEDICAID

## 2021-01-28 VITALS
SYSTOLIC BLOOD PRESSURE: 129 MMHG | DIASTOLIC BLOOD PRESSURE: 82 MMHG | WEIGHT: 255 LBS | HEART RATE: 106 BPM | BODY MASS INDEX: 47 KG/M2

## 2021-01-28 VITALS — SYSTOLIC BLOOD PRESSURE: 132 MMHG | HEART RATE: 88 BPM | DIASTOLIC BLOOD PRESSURE: 64 MMHG

## 2021-01-28 DIAGNOSIS — O26.899 RH NEGATIVE STATE IN ANTEPARTUM PERIOD: ICD-10-CM

## 2021-01-28 DIAGNOSIS — O35.8XX0 ENCOUNTER FOR REPEAT ULTRASOUND OF FETAL PYELECTASIS IN SINGLETON PREGNANCY, ANTEPARTUM: ICD-10-CM

## 2021-01-28 DIAGNOSIS — Z03.75 SUSPECTED CERVICAL SHORTENING NOT FOUND: ICD-10-CM

## 2021-01-28 DIAGNOSIS — O35.8XX0 PYELECTASIS OF FETUS ON PRENATAL ULTRASOUND: ICD-10-CM

## 2021-01-28 DIAGNOSIS — Z67.91 RH NEGATIVE STATE IN ANTEPARTUM PERIOD: ICD-10-CM

## 2021-01-28 DIAGNOSIS — O35.0XX0 PREGNANCY COMPLICATED BY FETAL CEREBRAL VENTRICULOMEGALY, SINGLE OR UNSPECIFIED FETUS: ICD-10-CM

## 2021-01-28 DIAGNOSIS — O09.521 MULTIGRAVIDA OF ADVANCED MATERNAL AGE IN FIRST TRIMESTER: ICD-10-CM

## 2021-01-28 DIAGNOSIS — E66.01 OBESITY, CLASS III, BMI 40-49.9 (MORBID OBESITY) (HCC): Primary | ICD-10-CM

## 2021-01-28 DIAGNOSIS — E66.01 OBESITY, CLASS III, BMI 40-49.9 (MORBID OBESITY) (HCC): ICD-10-CM

## 2021-01-28 DIAGNOSIS — Z29.13 NEED FOR RHOGAM DUE TO RH NEGATIVE MOTHER: Primary | ICD-10-CM

## 2021-01-28 PROCEDURE — 76811 OB US DETAILED SNGL FETUS: CPT | Performed by: OBSTETRICS & GYNECOLOGY

## 2021-01-28 PROCEDURE — 99215 OFFICE O/P EST HI 40 MIN: CPT | Performed by: OBSTETRICS & GYNECOLOGY

## 2021-01-28 PROCEDURE — 96372 THER/PROPH/DIAG INJ SC/IM: CPT | Performed by: OBSTETRICS & GYNECOLOGY

## 2021-01-28 PROCEDURE — 76946 ECHO GUIDE FOR AMNIOCENTESIS: CPT | Performed by: OBSTETRICS & GYNECOLOGY

## 2021-01-28 PROCEDURE — 59000 AMNIOCENTESIS DIAGNOSTIC: CPT | Performed by: OBSTETRICS & GYNECOLOGY

## 2021-01-28 NOTE — PROGRESS NOTES
Prenatal patient in today for Rhogam injection for Amniocentesis done with [facility] today. Patient is 20w1d today. Injection given LEFT UPPER QUAD. GLUTEUS . Patient tolerated well. Rhogam information sheet provided. Patient instructed to follow up with [facility].  P

## 2021-01-28 NOTE — ADDENDUM NOTE
Encounter addended by: Henry Bradford on: 1/28/2021 2:50 PM   Actions taken: Visit diagnoses modified, Charge Capture section accepted

## 2021-01-28 NOTE — PROGRESS NOTES
Pt for Diagnostic Amnio  Pre and post procedure instructions discussed handout given  Consent signed  Labs drawn per [employer] request

## 2021-01-29 ENCOUNTER — TELEPHONE (OUTPATIENT)
Dept: PERINATAL CARE | Facility: HOSPITAL | Age: 36
End: 2021-01-29

## 2021-01-29 NOTE — TELEPHONE ENCOUNTER
Called [employer] at [phone] was unable to find case number on [employer] website. Spoke to Walker who stated case is  and must start a new one.  Call ref#Isabelle AVALOS. [id number]    Will initiate a new case via [employer] for [id number], [id number]

## 2021-01-29 NOTE — TELEPHONE ENCOUNTER
[facility] results reviewed by Dr Pao Asif    This fish data set shown no evidence of aneuploidy for chromosomes 13,18,21,X and Y  Chromosome analysis on cultured cells is pending      Copy of report sent for scanning into pt record

## 2021-02-04 ENCOUNTER — TELEPHONE (OUTPATIENT)
Dept: PERINATAL CARE | Facility: HOSPITAL | Age: 36
End: 2021-02-04

## 2021-02-04 NOTE — TELEPHONE ENCOUNTER
[facility]  Amiotic Fluid AFP results obtained  Reviewed by Dr Jani Harrison  Results   The amniotic fluid AFP level is within Normal limits  Pending Karyotype    Pt states understanding

## 2021-02-08 ENCOUNTER — TELEPHONE (OUTPATIENT)
Dept: OBGYN CLINIC | Facility: CLINIC | Age: 36
End: 2021-02-08

## 2021-02-08 NOTE — TELEPHONE ENCOUNTER
Received a request from [employer] for breast pump. Form filled out and faxed to [employer]. Copy scanned.

## 2021-02-11 ENCOUNTER — TELEPHONE (OUTPATIENT)
Dept: PERINATAL CARE | Facility: HOSPITAL | Age: 36
End: 2021-02-11

## 2021-02-11 NOTE — TELEPHONE ENCOUNTER
[employer] [facility] results reviewed by Dr Kellie Boone     The Chromosome analysis results are    Normal  54 xy  Male sex chromosome complement    Chromosomal Microarray  Shows no significant aberrations identified      Unable to leave VM my chart message sent

## 2021-02-12 ENCOUNTER — ROUTINE PRENATAL (OUTPATIENT)
Dept: OBGYN CLINIC | Facility: CLINIC | Age: 36
End: 2021-02-12
Payer: MEDICAID

## 2021-02-12 VITALS
HEART RATE: 105 BPM | DIASTOLIC BLOOD PRESSURE: 81 MMHG | SYSTOLIC BLOOD PRESSURE: 118 MMHG | WEIGHT: 255 LBS | BODY MASS INDEX: 47 KG/M2

## 2021-02-12 DIAGNOSIS — Z34.82 ENCOUNTER FOR SUPERVISION OF OTHER NORMAL PREGNANCY IN SECOND TRIMESTER: Primary | ICD-10-CM

## 2021-02-12 LAB
APPEARANCE: CLEAR
MULTISTIX LOT#: 5077 NUMERIC
URINE-COLOR: YELLOW

## 2021-02-12 PROCEDURE — 81002 URINALYSIS NONAUTO W/O SCOPE: CPT | Performed by: OBSTETRICS & GYNECOLOGY

## 2021-02-12 PROCEDURE — 0502F SUBSEQUENT PRENATAL CARE: CPT | Performed by: OBSTETRICS & GYNECOLOGY

## 2021-02-12 PROCEDURE — 3074F SYST BP LT 130 MM HG: CPT | Performed by: OBSTETRICS & GYNECOLOGY

## 2021-02-12 PROCEDURE — 3079F DIAST BP 80-89 MM HG: CPT | Performed by: OBSTETRICS & GYNECOLOGY

## 2021-02-23 ENCOUNTER — HOSPITAL ENCOUNTER (OUTPATIENT)
Dept: PERINATAL CARE | Facility: HOSPITAL | Age: 36
Discharge: HOME | End: 2021-02-23
Attending: PEDIATRICS
Payer: MEDICAID

## 2021-02-23 ENCOUNTER — ORDER TRANSCRIPTION (OUTPATIENT)
Dept: SLEEP CENTER | Age: 36
End: 2021-02-23

## 2021-02-23 ENCOUNTER — OFFICE VISIT (OUTPATIENT)
Dept: PULMONOLOGY | Facility: CLINIC | Age: 36
End: 2021-02-23
Payer: MEDICAID

## 2021-02-23 VITALS
BODY MASS INDEX: 46.96 KG/M2 | RESPIRATION RATE: 18 BRPM | HEART RATE: 98 BPM | HEIGHT: 62 IN | OXYGEN SATURATION: 100 % | TEMPERATURE: 99 F | SYSTOLIC BLOOD PRESSURE: 124 MMHG | DIASTOLIC BLOOD PRESSURE: 71 MMHG | WEIGHT: 255.19 LBS

## 2021-02-23 DIAGNOSIS — E66.01 MORBID OBESITY WITH BMI OF 45.0-49.9, ADULT (HCC): ICD-10-CM

## 2021-02-23 DIAGNOSIS — O09.521 MULTIGRAVIDA OF ADVANCED MATERNAL AGE IN FIRST TRIMESTER: ICD-10-CM

## 2021-02-23 DIAGNOSIS — O35.9XX0 FETAL ABNORMALITY AFFECTING MANAGEMENT OF MOTHER, SINGLE OR UNSPECIFIED FETUS: Primary | ICD-10-CM

## 2021-02-23 DIAGNOSIS — G47.33 OBSTRUCTIVE SLEEP APNEA (ADULT) (PEDIATRIC): Primary | ICD-10-CM

## 2021-02-23 DIAGNOSIS — G47.10 HYPERSOMNIA: Primary | ICD-10-CM

## 2021-02-23 DIAGNOSIS — Z36.83 ENCOUNTER FOR FETAL SCREENING FOR CONGENITAL CARDIAC ABNORMALITIES: ICD-10-CM

## 2021-02-23 PROCEDURE — 3008F BODY MASS INDEX DOCD: CPT | Performed by: INTERNAL MEDICINE

## 2021-02-23 PROCEDURE — 76827 ECHO EXAM OF FETAL HEART: CPT

## 2021-02-23 PROCEDURE — 99244 OFF/OP CNSLTJ NEW/EST MOD 40: CPT | Performed by: INTERNAL MEDICINE

## 2021-02-23 PROCEDURE — 3074F SYST BP LT 130 MM HG: CPT | Performed by: INTERNAL MEDICINE

## 2021-02-23 PROCEDURE — 3078F DIAST BP <80 MM HG: CPT | Performed by: INTERNAL MEDICINE

## 2021-02-23 PROCEDURE — 76825 ECHO EXAM OF FETAL HEART: CPT | Performed by: PEDIATRICS

## 2021-02-23 PROCEDURE — 93325 DOPPLER ECHO COLOR FLOW MAPG: CPT

## 2021-02-23 NOTE — H&P
Referring Physician  Nichole Martinez. MD Caitie    Chief Complaint  Sleep apnea evaluation    History of Present Illness  Patient is a 26-year-old female who presents to pulmonary clinic for initial visit. Currently 6-1/2 months pregnant.   States she was told to be Occasional  Illicit Drugs: Denies    Medications    •  Prenatal MV & Min w/FA-DHA ([facility]), Take by mouth., Disp: , Rfl:     No current facility-administered medications on file prior to visit.        Allergies  No Known Allergies    Physical

## 2021-02-23 NOTE — ADDENDUM NOTE
Encounter addended by: Kailash Suarez on: 2/23/2021 1:05 PM   Actions taken: Charge Capture section accepted, Visit diagnoses modified

## 2021-02-23 NOTE — PROGRESS NOTES
CARDIOLOGY CONSULTATION AND FETAL ECHOCARDIOGRAM :    Dear Dr. Mark Combs,     Thank you for requesting fetal echocardiogram and  cardiology consultation on your patient Layne Alcocer.     As you are aware she is a 38year old female w None, Apgar1: None, Apgar5: None, Living: None, Birth Comments: None     # 8 - Date: None, Sex: None, Weight: None, GA: None, Delivery: None, Apgar1: None, Apgar5: None, Living: None, Birth Comments: None     Past Medical History  The patient  has a past m right to left. Right ventricle: Normal sized RV with normal systolic functions. Left ventricle: Normal sized LV with normal systolic functions.  Ventricular septum: Visualized portions of the ventricular are intact  Tricuspid valve: normal size and morpholo picture of the fetal heart and went over my findings with her. I recommended her to stay away from caffeine and caffeinated food and drinks. She should also stay away from the medications which may induce cardiac arrhythmia.  She stated that she is only on

## 2021-02-24 ENCOUNTER — LAB ENCOUNTER (OUTPATIENT)
Dept: LAB | Facility: HOSPITAL | Age: 36
End: 2021-02-24
Attending: OBSTETRICS & GYNECOLOGY
Payer: MEDICAID

## 2021-02-24 ENCOUNTER — TELEPHONE (OUTPATIENT)
Dept: OBGYN CLINIC | Facility: CLINIC | Age: 36
End: 2021-02-24

## 2021-02-24 DIAGNOSIS — Z34.82 ENCOUNTER FOR SUPERVISION OF OTHER NORMAL PREGNANCY IN SECOND TRIMESTER: ICD-10-CM

## 2021-02-24 LAB
ANTIBODY SCREEN: POSITIVE
DEPRECATED RDW RBC AUTO: 47.4 FL (ref 35.1–46.3)
ERYTHROCYTE [DISTWIDTH] IN BLOOD BY AUTOMATED COUNT: 13.6 % (ref 11–15)
GLUCOSE 1H P GLC SERPL-MCNC: 131 MG/DL
HCT VFR BLD AUTO: 34.7 %
HGB BLD-MCNC: 11.3 G/DL
MCH RBC QN AUTO: 30.9 PG (ref 26–34)
MCHC RBC AUTO-ENTMCNC: 32.6 G/DL (ref 31–37)
MCV RBC AUTO: 94.8 FL
PLATELET # BLD AUTO: 294 10(3)UL (ref 150–450)
RBC # BLD AUTO: 3.66 X10(6)UL
RH BLOOD TYPE: NEGATIVE
WBC # BLD AUTO: 7.8 X10(3) UL (ref 4–11)

## 2021-02-24 PROCEDURE — 85027 COMPLETE CBC AUTOMATED: CPT

## 2021-02-24 PROCEDURE — 86870 RBC ANTIBODY IDENTIFICATION: CPT

## 2021-02-24 PROCEDURE — 86901 BLOOD TYPING SEROLOGIC RH(D): CPT

## 2021-02-24 PROCEDURE — 82950 GLUCOSE TEST: CPT

## 2021-02-24 PROCEDURE — 86850 RBC ANTIBODY SCREEN: CPT

## 2021-02-24 PROCEDURE — 36415 COLL VENOUS BLD VENIPUNCTURE: CPT

## 2021-02-24 PROCEDURE — 86900 BLOOD TYPING SEROLOGIC ABO: CPT

## 2021-02-24 NOTE — TELEPHONE ENCOUNTER
Informed Suann Shall (blood bank) pt received RhoGam on 1/28/2021. Suann Shall verbalized understanding.

## 2021-03-03 ENCOUNTER — ROUTINE PRENATAL (OUTPATIENT)
Dept: OBGYN CLINIC | Facility: CLINIC | Age: 36
End: 2021-03-03
Payer: MEDICAID

## 2021-03-03 VITALS
SYSTOLIC BLOOD PRESSURE: 117 MMHG | WEIGHT: 257.19 LBS | DIASTOLIC BLOOD PRESSURE: 79 MMHG | BODY MASS INDEX: 47 KG/M2 | HEART RATE: 106 BPM

## 2021-03-03 DIAGNOSIS — Z34.82 ENCOUNTER FOR SUPERVISION OF OTHER NORMAL PREGNANCY IN SECOND TRIMESTER: Primary | ICD-10-CM

## 2021-03-03 LAB
APPEARANCE: CLEAR
MULTISTIX LOT#: 5077 NUMERIC
PH, URINE: 7 (ref 4.5–8)
SPECIFIC GRAVITY: 1.02 (ref 1–1.03)
URINE-COLOR: YELLOW
UROBILINOGEN,SEMI-QN: 0.2 MG/DL (ref 0–1.9)

## 2021-03-03 PROCEDURE — 81002 URINALYSIS NONAUTO W/O SCOPE: CPT | Performed by: OBSTETRICS & GYNECOLOGY

## 2021-03-03 PROCEDURE — 3074F SYST BP LT 130 MM HG: CPT | Performed by: OBSTETRICS & GYNECOLOGY

## 2021-03-03 PROCEDURE — 3078F DIAST BP <80 MM HG: CPT | Performed by: OBSTETRICS & GYNECOLOGY

## 2021-03-03 PROCEDURE — 0502F SUBSEQUENT PRENATAL CARE: CPT | Performed by: OBSTETRICS & GYNECOLOGY

## 2021-03-03 NOTE — PROGRESS NOTES
No issues. Normal fetal echo with occasional PACs. Repeat fetal echo in 4 weeks  FHT ascultated > 1 min today and no PACs. Pt has stopped green tea and chocolate. RTC 2 wks. Has mfm growth and peds cardio f/u scheduled. Sleep study 4/2.

## 2021-03-12 ENCOUNTER — ROUTINE PRENATAL (OUTPATIENT)
Dept: OBGYN CLINIC | Facility: CLINIC | Age: 36
End: 2021-03-12
Payer: MEDICAID

## 2021-03-12 VITALS
DIASTOLIC BLOOD PRESSURE: 78 MMHG | HEART RATE: 87 BPM | SYSTOLIC BLOOD PRESSURE: 118 MMHG | WEIGHT: 256.81 LBS | BODY MASS INDEX: 47 KG/M2

## 2021-03-12 DIAGNOSIS — Z34.91 ENCOUNTER FOR SUPERVISION OF NORMAL PREGNANCY IN FIRST TRIMESTER, UNSPECIFIED GRAVIDITY: Primary | ICD-10-CM

## 2021-03-12 LAB
APPEARANCE: CLEAR
MULTISTIX LOT#: 5077 NUMERIC
PH, URINE: 8 (ref 4.5–8)
SPECIFIC GRAVITY: 1.01 (ref 1–1.03)
URINE-COLOR: YELLOW

## 2021-03-12 PROCEDURE — 3074F SYST BP LT 130 MM HG: CPT | Performed by: OBSTETRICS & GYNECOLOGY

## 2021-03-12 PROCEDURE — 3078F DIAST BP <80 MM HG: CPT | Performed by: OBSTETRICS & GYNECOLOGY

## 2021-03-12 PROCEDURE — 0502F SUBSEQUENT PRENATAL CARE: CPT | Performed by: OBSTETRICS & GYNECOLOGY

## 2021-03-12 PROCEDURE — 81002 URINALYSIS NONAUTO W/O SCOPE: CPT | Performed by: OBSTETRICS & GYNECOLOGY

## 2021-03-14 PROBLEM — O28.3 ABNORMAL FETAL ULTRASOUND: Status: ACTIVE | Noted: 2021-03-14

## 2021-03-15 NOTE — PROGRESS NOTES
Boy. No S/S of PTL. Rhogam was given at 23w6d after amnio. Weekly Dr Wells Poag visits to check for arrhythmia. None heard today.

## 2021-03-21 NOTE — PROGRESS NOTES
Geetha Hernandez  Dear Dr. Nancy Simmons,     Thank you for requesting ultrasound evaluation and maternal fetal medicine consultation on your patient Luz Maria Alcocer.  As you are aware she is a 45/40 year old female [id number] ADJT a S BORDERLINE VENTRICULOMEGALY ON LEVELII  The incidence of borderline ventriculomegaly is uncertain. It is generally defined as atria of the lateral ventricle which measures between 10-12 mm.   Ventriculomegaly has been reported as bilateral or unilatera reasons for her to call her physician. Normal  55 XY  Male sex chromosome complement, Chromosomal Microarray  Shows no significant aberrations identified.   Normal MSAFP     OBESITY:  Her BMI prior to pregnancy was 50  See MFM note from 12/3/2020 for detai

## 2021-03-23 ENCOUNTER — HOSPITAL ENCOUNTER (OUTPATIENT)
Dept: PERINATAL CARE | Facility: HOSPITAL | Age: 36
Discharge: HOME | End: 2021-03-23
Attending: PEDIATRICS
Payer: MEDICAID

## 2021-03-23 DIAGNOSIS — O28.3 ABNORMAL FETAL ULTRASOUND: ICD-10-CM

## 2021-03-23 DIAGNOSIS — O28.3 ABNORMAL FETAL ULTRASOUND: Primary | ICD-10-CM

## 2021-03-23 DIAGNOSIS — O09.521 MULTIGRAVIDA OF ADVANCED MATERNAL AGE IN FIRST TRIMESTER: ICD-10-CM

## 2021-03-23 DIAGNOSIS — E66.01 MORBID OBESITY WITH BMI OF 45.0-49.9, ADULT (HCC): ICD-10-CM

## 2021-03-23 PROCEDURE — 76826 ECHO EXAM OF FETAL HEART: CPT | Performed by: PEDIATRICS

## 2021-03-23 PROCEDURE — 93325 DOPPLER ECHO COLOR FLOW MAPG: CPT

## 2021-03-23 PROCEDURE — 76828 ECHO EXAM OF FETAL HEART: CPT

## 2021-03-23 NOTE — PROGRESS NOTES
CARDIOLOGY CONSULTATION AND FETAL ECHOCARDIOGRAM :     Dear Brea Valentine,     Thank you for requesting a follow up fetal echocardiogram and follow up  cardiology consultation on your patient Autumn Alcocer.    As you are aware she is Living: None, Birth Comments: None     # 7 - Date: 2016, Sex: None, Weight: None, GA: 12w0d, Delivery: None, Apgar1: None, Apgar5: None, Living: None, Birth Comments: None     # 8 - Date: None, Sex: None, Weight: None, GA: None, Delivery: None, Apgar1: Non left atrium. Right atrium: Normal in size. Left atrium: Normal in size. Atrial septum: foramen ovale in the central third/half, flap valve in LA. Foramen ovale: normal flow: right to left. Right ventricle: Normal sized RV with normal systolic functions.  Tim Singletary normal with one to one conduction. PACs appeared to be resolved. I had a lengthy discussion regarding my findings with the mother. I  recommended her  to stay away from caffeine and caffeinated food and drinks.  She should also stay away from the [facility]

## 2021-03-25 ENCOUNTER — HOSPITAL ENCOUNTER (OUTPATIENT)
Dept: PERINATAL CARE | Facility: HOSPITAL | Age: 36
Discharge: HOME | End: 2021-03-25
Attending: OBSTETRICS & GYNECOLOGY
Payer: MEDICAID

## 2021-03-25 ENCOUNTER — TELEPHONE (OUTPATIENT)
Dept: PERINATAL CARE | Facility: HOSPITAL | Age: 36
End: 2021-03-25

## 2021-03-25 VITALS
BODY MASS INDEX: 47 KG/M2 | SYSTOLIC BLOOD PRESSURE: 129 MMHG | DIASTOLIC BLOOD PRESSURE: 76 MMHG | HEART RATE: 96 BPM | WEIGHT: 256 LBS

## 2021-03-25 DIAGNOSIS — E66.01 MORBID OBESITY WITH BMI OF 45.0-49.9, ADULT (HCC): ICD-10-CM

## 2021-03-25 DIAGNOSIS — O28.3 ABNORMAL FETAL ULTRASOUND: ICD-10-CM

## 2021-03-25 DIAGNOSIS — O09.523 AMA (ADVANCED MATERNAL AGE) MULTIGRAVIDA 35+, THIRD TRIMESTER: ICD-10-CM

## 2021-03-25 DIAGNOSIS — E66.01 OBESITY, CLASS III, BMI 40-49.9 (MORBID OBESITY) (HCC): ICD-10-CM

## 2021-03-25 DIAGNOSIS — O09.523 AMA (ADVANCED MATERNAL AGE) MULTIGRAVIDA 35+, THIRD TRIMESTER: Primary | ICD-10-CM

## 2021-03-25 DIAGNOSIS — O99.213 OBESITY AFFECTING PREGNANCY IN THIRD TRIMESTER: ICD-10-CM

## 2021-03-25 PROCEDURE — 76816 OB US FOLLOW-UP PER FETUS: CPT | Performed by: OBSTETRICS & GYNECOLOGY

## 2021-03-25 PROCEDURE — 99214 OFFICE O/P EST MOD 30 MIN: CPT | Performed by: OBSTETRICS & GYNECOLOGY

## 2021-03-26 ENCOUNTER — ROUTINE PRENATAL (OUTPATIENT)
Dept: OBGYN CLINIC | Facility: CLINIC | Age: 36
End: 2021-03-26
Payer: MEDICAID

## 2021-03-26 ENCOUNTER — TELEPHONE (OUTPATIENT)
Dept: OBGYN CLINIC | Facility: CLINIC | Age: 36
End: 2021-03-26

## 2021-03-26 VITALS
WEIGHT: 258.19 LBS | SYSTOLIC BLOOD PRESSURE: 124 MMHG | DIASTOLIC BLOOD PRESSURE: 81 MMHG | HEART RATE: 87 BPM | BODY MASS INDEX: 47 KG/M2

## 2021-03-26 DIAGNOSIS — Z34.82 ENCOUNTER FOR SUPERVISION OF OTHER NORMAL PREGNANCY IN SECOND TRIMESTER: Primary | ICD-10-CM

## 2021-03-26 PROCEDURE — 3074F SYST BP LT 130 MM HG: CPT | Performed by: OBSTETRICS & GYNECOLOGY

## 2021-03-26 PROCEDURE — 3079F DIAST BP 80-89 MM HG: CPT | Performed by: OBSTETRICS & GYNECOLOGY

## 2021-03-26 PROCEDURE — 0502F SUBSEQUENT PRENATAL CARE: CPT | Performed by: OBSTETRICS & GYNECOLOGY

## 2021-03-26 PROCEDURE — 81002 URINALYSIS NONAUTO W/O SCOPE: CPT | Performed by: OBSTETRICS & GYNECOLOGY

## 2021-03-30 ENCOUNTER — LAB ENCOUNTER (OUTPATIENT)
Dept: LAB | Age: 36
End: 2021-03-30
Attending: INTERNAL MEDICINE
Payer: MEDICAID

## 2021-03-30 DIAGNOSIS — G47.33 OBSTRUCTIVE SLEEP APNEA (ADULT) (PEDIATRIC): ICD-10-CM

## 2021-04-08 ENCOUNTER — ROUTINE PRENATAL (OUTPATIENT)
Dept: OBGYN CLINIC | Facility: CLINIC | Age: 36
End: 2021-04-08
Payer: MEDICAID

## 2021-04-08 VITALS
HEART RATE: 87 BPM | BODY MASS INDEX: 47 KG/M2 | DIASTOLIC BLOOD PRESSURE: 76 MMHG | WEIGHT: 256.81 LBS | SYSTOLIC BLOOD PRESSURE: 118 MMHG

## 2021-04-08 DIAGNOSIS — Z34.83 ENCOUNTER FOR SUPERVISION OF OTHER NORMAL PREGNANCY IN THIRD TRIMESTER: Primary | ICD-10-CM

## 2021-04-08 PROCEDURE — 81002 URINALYSIS NONAUTO W/O SCOPE: CPT | Performed by: OBSTETRICS & GYNECOLOGY

## 2021-04-08 PROCEDURE — 3074F SYST BP LT 130 MM HG: CPT | Performed by: OBSTETRICS & GYNECOLOGY

## 2021-04-08 PROCEDURE — 0502F SUBSEQUENT PRENATAL CARE: CPT | Performed by: OBSTETRICS & GYNECOLOGY

## 2021-04-08 PROCEDURE — 3078F DIAST BP <80 MM HG: CPT | Performed by: OBSTETRICS & GYNECOLOGY

## 2021-04-15 NOTE — PROGRESS NOTES
Outpatient Maternal-Fetal Medicine Follow-Up     Dear [facility],     Thank you for requesting ultrasound evaluation and maternal fetal medicine consultation on your patient Christine Alcocer.  As you are aware she is a [age] year old female [id number] CAROLYN FRANCO    BORDERLINE VENTRICULOMEGALY ON LEVELII  Reviewed. See MFM note from 1/28/21 & 3/25/21 for detailed review.    Hence I advised reevaluation  fetal growth and the fetal CNS anatomy in 4 weeks.   Normal  47 XY  Male sex chromosome complement, Chromosomal to nonpregnant women. Fortunately, the absolute risk for severe COVID-19 is low, the data indicates an increased risk for ICU admission, need for mechanical ventilation, and death in pregnant women over their peers ([address] [address] 2020).  In pregnant patient echocardiogram with Dr. Bhakti Snowden      Thank you for allowing me to participate in the care of your patient. Valdo Moraes do not hesitate to contact me if additional questions or concerns arise.       Zac Small M.D.   Maternal-Fetal Medicine     20 minutes spen

## 2021-04-20 ENCOUNTER — ROUTINE PRENATAL (OUTPATIENT)
Dept: OBGYN CLINIC | Facility: CLINIC | Age: 36
End: 2021-04-20
Payer: MEDICAID

## 2021-04-20 ENCOUNTER — LAB ENCOUNTER (OUTPATIENT)
Dept: LAB | Facility: HOSPITAL | Age: 36
End: 2021-04-20
Attending: OBSTETRICS & GYNECOLOGY
Payer: MEDICAID

## 2021-04-20 ENCOUNTER — TELEPHONE (OUTPATIENT)
Dept: OBGYN CLINIC | Facility: CLINIC | Age: 36
End: 2021-04-20

## 2021-04-20 VITALS
SYSTOLIC BLOOD PRESSURE: 128 MMHG | HEART RATE: 99 BPM | DIASTOLIC BLOOD PRESSURE: 84 MMHG | WEIGHT: 262.19 LBS | BODY MASS INDEX: 48 KG/M2

## 2021-04-20 DIAGNOSIS — Z67.91 RH NEGATIVE STATE IN ANTEPARTUM PERIOD: ICD-10-CM

## 2021-04-20 DIAGNOSIS — Z34.93 ENCOUNTER FOR SUPERVISION OF NORMAL PREGNANCY IN THIRD TRIMESTER, UNSPECIFIED GRAVIDITY: Primary | ICD-10-CM

## 2021-04-20 DIAGNOSIS — Z34.93 ENCOUNTER FOR SUPERVISION OF NORMAL PREGNANCY IN THIRD TRIMESTER, UNSPECIFIED GRAVIDITY: ICD-10-CM

## 2021-04-20 DIAGNOSIS — O26.899 RH NEGATIVE STATE IN ANTEPARTUM PERIOD: ICD-10-CM

## 2021-04-20 PROCEDURE — 86850 RBC ANTIBODY SCREEN: CPT

## 2021-04-20 PROCEDURE — 86901 BLOOD TYPING SEROLOGIC RH(D): CPT

## 2021-04-20 PROCEDURE — 96372 THER/PROPH/DIAG INJ SC/IM: CPT | Performed by: OBSTETRICS & GYNECOLOGY

## 2021-04-20 PROCEDURE — 81002 URINALYSIS NONAUTO W/O SCOPE: CPT | Performed by: OBSTETRICS & GYNECOLOGY

## 2021-04-20 PROCEDURE — 86900 BLOOD TYPING SEROLOGIC ABO: CPT

## 2021-04-20 PROCEDURE — 3074F SYST BP LT 130 MM HG: CPT | Performed by: OBSTETRICS & GYNECOLOGY

## 2021-04-20 PROCEDURE — 90715 TDAP VACCINE 7 YRS/> IM: CPT | Performed by: OBSTETRICS & GYNECOLOGY

## 2021-04-20 PROCEDURE — 3079F DIAST BP 80-89 MM HG: CPT | Performed by: OBSTETRICS & GYNECOLOGY

## 2021-04-20 PROCEDURE — 0502F SUBSEQUENT PRENATAL CARE: CPT | Performed by: OBSTETRICS & GYNECOLOGY

## 2021-04-20 PROCEDURE — 90471 IMMUNIZATION ADMIN: CPT | Performed by: OBSTETRICS & GYNECOLOGY

## 2021-04-20 PROCEDURE — 36415 COLL VENOUS BLD VENIPUNCTURE: CPT

## 2021-04-20 NOTE — TELEPHONE ENCOUNTER
Dwight for pt to call the office back.  Per EFRAÍNK she was supposed to go to the lab for ABO/RH and antibody screen , then return to the office to get her TDAP and Rhogam injection

## 2021-04-20 NOTE — TELEPHONE ENCOUNTER
Pt went for abo/rh and  Antibody screen, before she came for her Rhogam injection. Pt went for labs and they are still pending. Rhogam given in RGM and pt tolerated well. Tdap given in right deltoid and tolerated well. Rhogam card given to pt. Information sheet on Rhogam given to pt. Pt signed Rhogam form. Pt signed Tdap form.

## 2021-04-22 ENCOUNTER — HOSPITAL ENCOUNTER (OUTPATIENT)
Dept: PERINATAL CARE | Facility: HOSPITAL | Age: 36
Discharge: HOME | End: 2021-04-22
Attending: OBSTETRICS & GYNECOLOGY
Payer: MEDICAID

## 2021-04-22 VITALS — SYSTOLIC BLOOD PRESSURE: 141 MMHG | DIASTOLIC BLOOD PRESSURE: 76 MMHG | BODY MASS INDEX: 48 KG/M2 | WEIGHT: 262 LBS

## 2021-04-22 DIAGNOSIS — O09.521 MULTIGRAVIDA OF ADVANCED MATERNAL AGE IN FIRST TRIMESTER: ICD-10-CM

## 2021-04-22 DIAGNOSIS — E66.01 OBESITY, CLASS III, BMI 40-49.9 (MORBID OBESITY) (HCC): ICD-10-CM

## 2021-04-22 DIAGNOSIS — E66.01 MORBID OBESITY WITH BMI OF 45.0-49.9, ADULT (HCC): ICD-10-CM

## 2021-04-22 DIAGNOSIS — O28.3 ABNORMAL FETAL ULTRASOUND: ICD-10-CM

## 2021-04-22 DIAGNOSIS — E66.01 MORBID OBESITY WITH BMI OF 45.0-49.9, ADULT (HCC): Primary | ICD-10-CM

## 2021-04-22 DIAGNOSIS — O09.523 MULTIGRAVIDA OF ADVANCED MATERNAL AGE IN THIRD TRIMESTER: ICD-10-CM

## 2021-04-22 PROCEDURE — 99213 OFFICE O/P EST LOW 20 MIN: CPT | Performed by: OBSTETRICS & GYNECOLOGY

## 2021-04-22 PROCEDURE — 76816 OB US FOLLOW-UP PER FETUS: CPT | Performed by: OBSTETRICS & GYNECOLOGY

## 2021-05-07 ENCOUNTER — ROUTINE PRENATAL (OUTPATIENT)
Dept: OBGYN CLINIC | Facility: CLINIC | Age: 36
End: 2021-05-07
Payer: MEDICAID

## 2021-05-07 VITALS
DIASTOLIC BLOOD PRESSURE: 86 MMHG | HEART RATE: 94 BPM | SYSTOLIC BLOOD PRESSURE: 135 MMHG | WEIGHT: 263 LBS | BODY MASS INDEX: 48 KG/M2

## 2021-05-07 DIAGNOSIS — Z34.93 ENCOUNTER FOR SUPERVISION OF NORMAL PREGNANCY IN THIRD TRIMESTER, UNSPECIFIED GRAVIDITY: Primary | ICD-10-CM

## 2021-05-07 PROCEDURE — 3079F DIAST BP 80-89 MM HG: CPT | Performed by: OBSTETRICS & GYNECOLOGY

## 2021-05-07 PROCEDURE — 0502F SUBSEQUENT PRENATAL CARE: CPT | Performed by: OBSTETRICS & GYNECOLOGY

## 2021-05-07 PROCEDURE — 81002 URINALYSIS NONAUTO W/O SCOPE: CPT | Performed by: OBSTETRICS & GYNECOLOGY

## 2021-05-07 PROCEDURE — 3075F SYST BP GE 130 - 139MM HG: CPT | Performed by: OBSTETRICS & GYNECOLOGY

## 2021-05-13 NOTE — PROGRESS NOTES
Outpatient Maternal-Fetal Medicine Follow-Up     Dear [facility],     Thank you for requesting ultrasound evaluation and maternal fetal medicine consultation on your patient Christine Alcocer.  As you are aware she is a [age] year old female [id number] DEON FRANCO MFM note from 1/28/21 & 3/25/21 for detailed review.    Hence I advised reevaluation  fetal growth and the fetal CNS anatomy in 4 weeks.   Normal  46 XY  Male sex chromosome complement, Chromosomal Microarray  Shows no significant aberrations identified.  N

## 2021-05-17 ENCOUNTER — LAB ENCOUNTER (OUTPATIENT)
Dept: LAB | Facility: HOSPITAL | Age: 36
End: 2021-05-17
Attending: OBSTETRICS & GYNECOLOGY
Payer: MEDICAID

## 2021-05-17 DIAGNOSIS — Z34.93 ENCOUNTER FOR SUPERVISION OF NORMAL PREGNANCY IN THIRD TRIMESTER, UNSPECIFIED GRAVIDITY: ICD-10-CM

## 2021-05-17 PROCEDURE — 87389 HIV-1 AG W/HIV-1&-2 AB AG IA: CPT

## 2021-05-17 PROCEDURE — 85027 COMPLETE CBC AUTOMATED: CPT

## 2021-05-17 PROCEDURE — 36415 COLL VENOUS BLD VENIPUNCTURE: CPT

## 2021-05-17 PROCEDURE — 86780 TREPONEMA PALLIDUM: CPT

## 2021-05-19 ENCOUNTER — ROUTINE PRENATAL (OUTPATIENT)
Dept: OBGYN CLINIC | Facility: CLINIC | Age: 36
End: 2021-05-19
Payer: MEDICAID

## 2021-05-19 VITALS
DIASTOLIC BLOOD PRESSURE: 84 MMHG | BODY MASS INDEX: 49 KG/M2 | HEART RATE: 81 BPM | SYSTOLIC BLOOD PRESSURE: 130 MMHG | WEIGHT: 266 LBS

## 2021-05-19 DIAGNOSIS — Z34.92 ENCOUNTER FOR SUPERVISION OF NORMAL PREGNANCY IN SECOND TRIMESTER, UNSPECIFIED GRAVIDITY: Primary | ICD-10-CM

## 2021-05-19 PROCEDURE — 3075F SYST BP GE 130 - 139MM HG: CPT | Performed by: OBSTETRICS & GYNECOLOGY

## 2021-05-19 PROCEDURE — 3079F DIAST BP 80-89 MM HG: CPT | Performed by: OBSTETRICS & GYNECOLOGY

## 2021-05-19 PROCEDURE — 81002 URINALYSIS NONAUTO W/O SCOPE: CPT | Performed by: OBSTETRICS & GYNECOLOGY

## 2021-05-19 PROCEDURE — 0502F SUBSEQUENT PRENATAL CARE: CPT | Performed by: OBSTETRICS & GYNECOLOGY

## 2021-05-20 ENCOUNTER — HOSPITAL ENCOUNTER (OUTPATIENT)
Dept: PERINATAL CARE | Facility: HOSPITAL | Age: 36
Discharge: HOME | End: 2021-05-20
Attending: OBSTETRICS & GYNECOLOGY
Payer: MEDICAID

## 2021-05-20 ENCOUNTER — TELEPHONE (OUTPATIENT)
Dept: PEDIATRICS CLINIC | Facility: CLINIC | Age: 36
End: 2021-05-20

## 2021-05-20 ENCOUNTER — MED REC SCAN ONLY (OUTPATIENT)
Dept: ADMINISTRATIVE | Age: 36
End: 2021-05-20

## 2021-05-20 VITALS
HEART RATE: 93 BPM | SYSTOLIC BLOOD PRESSURE: 131 MMHG | DIASTOLIC BLOOD PRESSURE: 88 MMHG | BODY MASS INDEX: 49 KG/M2 | WEIGHT: 266 LBS

## 2021-05-20 DIAGNOSIS — O09.523 AMA (ADVANCED MATERNAL AGE) MULTIGRAVIDA 35+, THIRD TRIMESTER: ICD-10-CM

## 2021-05-20 DIAGNOSIS — O28.3 ABNORMAL FETAL ULTRASOUND: ICD-10-CM

## 2021-05-20 DIAGNOSIS — E66.01 OBESITY, CLASS III, BMI 40-49.9 (MORBID OBESITY) (HCC): ICD-10-CM

## 2021-05-20 DIAGNOSIS — E66.01 OBESITY, CLASS III, BMI 40-49.9 (MORBID OBESITY) (HCC): Primary | ICD-10-CM

## 2021-05-20 PROCEDURE — 76816 OB US FOLLOW-UP PER FETUS: CPT | Performed by: OBSTETRICS & GYNECOLOGY

## 2021-05-20 PROCEDURE — 76819 FETAL BIOPHYS PROFIL W/O NST: CPT | Performed by: OBSTETRICS & GYNECOLOGY

## 2021-05-20 PROCEDURE — 99213 OFFICE O/P EST LOW 20 MIN: CPT | Performed by: OBSTETRICS & GYNECOLOGY

## 2021-05-20 NOTE — TELEPHONE ENCOUNTER
Pt stopped in to drop off paperwork for FMLA    KENNY received  Payment Collected    Please follow up, thank you.

## 2021-05-23 ENCOUNTER — TELEPHONE (OUTPATIENT)
Dept: OBGYN CLINIC | Facility: CLINIC | Age: 36
End: 2021-05-23

## 2021-05-23 NOTE — TELEPHONE ENCOUNTER
Paged on call with c/o dysuria starting late this afternoon after work shift. This followed by a slight pink tinge when wiping after urination. No recent IC. No fever or urgency. No spontaneous blood, UC's or LOF.  I asked her to rest and hydrate tonight an

## 2021-05-25 ENCOUNTER — HOSPITAL ENCOUNTER (OUTPATIENT)
Dept: PERINATAL CARE | Facility: HOSPITAL | Age: 36
Discharge: HOME | End: 2021-05-25
Attending: OBSTETRICS & GYNECOLOGY
Payer: MEDICAID

## 2021-05-25 DIAGNOSIS — O09.521 MULTIGRAVIDA OF ADVANCED MATERNAL AGE IN FIRST TRIMESTER: Primary | ICD-10-CM

## 2021-05-25 PROCEDURE — 59025 FETAL NON-STRESS TEST: CPT

## 2021-05-25 PROCEDURE — 59025 FETAL NON-STRESS TEST: CPT | Performed by: OBSTETRICS & GYNECOLOGY

## 2021-05-25 NOTE — TELEPHONE ENCOUNTER
Pt reports she hydrated with water and cranberry juice and symptoms resolved. Advised pt to maintain good hydration by drinking at least 64 oz of water daily. Advised to call back if symptoms return. Pt agrees. PN appt on 5/28.

## 2021-05-27 NOTE — TELEPHONE ENCOUNTER
Dr. Lia Jones,     Please sign off on form: Fmla  -Highlight the patient and hit \"Chart\" button.   -In Chart Review, w/in the Encounter tab - click 1 time on the Telephone call encounter for 5/20/21 Scroll down the telephone encounter.  -Click \"scan on\" b

## 2021-05-28 ENCOUNTER — TELEPHONE (OUTPATIENT)
Dept: OBGYN CLINIC | Facility: CLINIC | Age: 36
End: 2021-05-28

## 2021-05-28 ENCOUNTER — ANESTHESIA (OUTPATIENT)
Dept: OBGYN UNIT | Facility: HOSPITAL | Age: 36
End: 2021-05-28
Payer: MEDICAID

## 2021-05-28 ENCOUNTER — ANESTHESIA EVENT (OUTPATIENT)
Dept: OBGYN UNIT | Facility: HOSPITAL | Age: 36
End: 2021-05-28
Payer: MEDICAID

## 2021-05-28 ENCOUNTER — HOSPITAL ENCOUNTER (INPATIENT)
Facility: HOSPITAL | Age: 36
LOS: 2 days | Discharge: HOME | End: 2021-05-30
Attending: OBSTETRICS & GYNECOLOGY | Admitting: OBSTETRICS & GYNECOLOGY
Payer: MEDICAID

## 2021-05-28 ENCOUNTER — ROUTINE PRENATAL (OUTPATIENT)
Dept: OBGYN CLINIC | Facility: CLINIC | Age: 36
End: 2021-05-28
Payer: MEDICAID

## 2021-05-28 VITALS
HEART RATE: 85 BPM | SYSTOLIC BLOOD PRESSURE: 137 MMHG | BODY MASS INDEX: 49 KG/M2 | WEIGHT: 269 LBS | DIASTOLIC BLOOD PRESSURE: 89 MMHG

## 2021-05-28 DIAGNOSIS — O42.90 LEAKAGE OF AMNIOTIC FLUID: ICD-10-CM

## 2021-05-28 DIAGNOSIS — Z34.93 ENCOUNTER FOR SUPERVISION OF NORMAL PREGNANCY IN THIRD TRIMESTER, UNSPECIFIED GRAVIDITY: Primary | ICD-10-CM

## 2021-05-28 PROBLEM — Z34.90 PREGNANCY: Status: ACTIVE | Noted: 2021-05-28

## 2021-05-28 PROBLEM — Z34.90 PREGNANCY (HCC): Status: ACTIVE | Noted: 2021-05-28

## 2021-05-28 PROCEDURE — 87210 SMEAR WET MOUNT SALINE/INK: CPT | Performed by: OBSTETRICS & GYNECOLOGY

## 2021-05-28 PROCEDURE — 0502F SUBSEQUENT PRENATAL CARE: CPT | Performed by: OBSTETRICS & GYNECOLOGY

## 2021-05-28 PROCEDURE — 0KQM0ZZ REPAIR PERINEUM MUSCLE, OPEN APPROACH: ICD-10-PCS | Performed by: OBSTETRICS & GYNECOLOGY

## 2021-05-28 PROCEDURE — 3079F DIAST BP 80-89 MM HG: CPT | Performed by: OBSTETRICS & GYNECOLOGY

## 2021-05-28 PROCEDURE — 81002 URINALYSIS NONAUTO W/O SCOPE: CPT | Performed by: OBSTETRICS & GYNECOLOGY

## 2021-05-28 PROCEDURE — 59409 OBSTETRICAL CARE: CPT | Performed by: OBSTETRICS & GYNECOLOGY

## 2021-05-28 PROCEDURE — 3075F SYST BP GE 130 - 139MM HG: CPT | Performed by: OBSTETRICS & GYNECOLOGY

## 2021-05-28 RX ORDER — IBUPROFEN 600 MG/1
600 TABLET ORAL EVERY 6 HOURS PRN
Status: DISCONTINUED | OUTPATIENT
Start: 2021-05-28 | End: 2021-05-30

## 2021-05-28 RX ORDER — TERBUTALINE SULFATE 1 MG/ML
0.25 INJECTION, SOLUTION SUBCUTANEOUS AS NEEDED
Status: DISCONTINUED | OUTPATIENT
Start: 2021-05-28 | End: 2021-05-28 | Stop reason: HOSPADM

## 2021-05-28 RX ORDER — ACETAMINOPHEN 325 MG/1
650 TABLET ORAL EVERY 6 HOURS PRN
Status: DISCONTINUED | OUTPATIENT
Start: 2021-05-28 | End: 2021-05-30

## 2021-05-28 RX ORDER — SIMETHICONE 80 MG
80 TABLET,CHEWABLE ORAL 3 TIMES DAILY PRN
Status: DISCONTINUED | OUTPATIENT
Start: 2021-05-28 | End: 2021-05-30

## 2021-05-28 RX ORDER — DOCUSATE SODIUM 100 MG/1
100 CAPSULE, LIQUID FILLED ORAL
Status: DISCONTINUED | OUTPATIENT
Start: 2021-05-28 | End: 2021-05-30

## 2021-05-28 RX ORDER — BUPIVACAINE HCL/0.9 % NACL/PF 0.25 %
5 PLASTIC BAG, INJECTION (ML) EPIDURAL AS NEEDED
Status: DISCONTINUED | OUTPATIENT
Start: 2021-05-28 | End: 2021-05-30

## 2021-05-28 RX ORDER — BUPIVACAINE HCL/0.9 % NACL/PF 0.25 %
5 PLASTIC BAG, INJECTION (ML) EPIDURAL AS NEEDED
Status: DISCONTINUED | OUTPATIENT
Start: 2021-05-28 | End: 2021-05-28

## 2021-05-28 RX ORDER — IBUPROFEN 600 MG/1
600 TABLET ORAL EVERY 6 HOURS PRN
Status: DISCONTINUED | OUTPATIENT
Start: 2021-05-28 | End: 2021-05-28 | Stop reason: HOSPADM

## 2021-05-28 RX ORDER — NALBUPHINE HCL 10 MG/ML
2.5 AMPUL (ML) INJECTION
Status: DISCONTINUED | OUTPATIENT
Start: 2021-05-28 | End: 2021-05-28

## 2021-05-28 RX ORDER — DIAPER,BRIEF,INFANT-TODD,DISP
1 EACH MISCELLANEOUS EVERY 6 HOURS PRN
Status: DISCONTINUED | OUTPATIENT
Start: 2021-05-28 | End: 2021-05-30

## 2021-05-28 RX ORDER — AMMONIA INHALANTS 0.04 G/.3ML
0.3 INHALANT RESPIRATORY (INHALATION) AS NEEDED
Status: DISCONTINUED | OUTPATIENT
Start: 2021-05-28 | End: 2021-05-28 | Stop reason: HOSPADM

## 2021-05-28 RX ORDER — ONDANSETRON 2 MG/ML
4 INJECTION INTRAMUSCULAR; INTRAVENOUS EVERY 6 HOURS PRN
Status: DISCONTINUED | OUTPATIENT
Start: 2021-05-28 | End: 2021-05-28

## 2021-05-28 RX ORDER — ACETAMINOPHEN 500 MG
500 TABLET ORAL EVERY 6 HOURS PRN
Status: DISCONTINUED | OUTPATIENT
Start: 2021-05-28 | End: 2021-05-28

## 2021-05-28 RX ORDER — BISACODYL 10 MG
10 SUPPOSITORY, RECTAL RECTAL ONCE AS NEEDED
Status: DISCONTINUED | OUTPATIENT
Start: 2021-05-28 | End: 2021-05-30

## 2021-05-28 RX ORDER — ONDANSETRON 2 MG/ML
4 INJECTION INTRAMUSCULAR; INTRAVENOUS EVERY 6 HOURS PRN
Status: DISCONTINUED | OUTPATIENT
Start: 2021-05-28 | End: 2021-05-30

## 2021-05-28 RX ORDER — SODIUM CHLORIDE, SODIUM LACTATE, POTASSIUM CHLORIDE, CALCIUM CHLORIDE 600; 310; 30; 20 MG/100ML; MG/100ML; MG/100ML; MG/100ML
INJECTION, SOLUTION INTRAVENOUS CONTINUOUS
Status: DISCONTINUED | OUTPATIENT
Start: 2021-05-28 | End: 2021-05-28

## 2021-05-28 RX ORDER — AMMONIA INHALANTS 0.04 G/.3ML
0.3 INHALANT RESPIRATORY (INHALATION) AS NEEDED
Status: DISCONTINUED | OUTPATIENT
Start: 2021-05-28 | End: 2021-05-30

## 2021-05-28 RX ORDER — BUPIVACAINE HYDROCHLORIDE 2.5 MG/ML
20 INJECTION, SOLUTION EPIDURAL; INFILTRATION; INTRACAUDAL ONCE
Status: DISCONTINUED | OUTPATIENT
Start: 2021-05-28 | End: 2021-05-28 | Stop reason: HOSPADM

## 2021-05-28 RX ORDER — TRISODIUM CITRATE DIHYDRATE AND CITRIC ACID MONOHYDRATE 500; 334 MG/5ML; MG/5ML
30 SOLUTION ORAL AS NEEDED
Status: DISCONTINUED | OUTPATIENT
Start: 2021-05-28 | End: 2021-05-28 | Stop reason: HOSPADM

## 2021-05-28 RX ORDER — NALBUPHINE HCL 10 MG/ML
2.5 AMPUL (ML) INJECTION
Status: DISCONTINUED | OUTPATIENT
Start: 2021-05-28 | End: 2021-05-30

## 2021-05-28 RX ORDER — BUPIVACAINE HYDROCHLORIDE 2.5 MG/ML
20 INJECTION, SOLUTION EPIDURAL; INFILTRATION; INTRACAUDAL ONCE
Status: DISCONTINUED | OUTPATIENT
Start: 2021-05-28 | End: 2021-05-28

## 2021-05-28 RX ORDER — LIDOCAINE HYDROCHLORIDE 10 MG/ML
30 INJECTION, SOLUTION EPIDURAL; INFILTRATION; INTRACAUDAL; PERINEURAL ONCE
Status: DISCONTINUED | OUTPATIENT
Start: 2021-05-28 | End: 2021-05-28 | Stop reason: HOSPADM

## 2021-05-28 NOTE — L&D DELIVERY NOTE
[facility]    Vaginal Delivery Note    Robert Alcocer Patient Status:  Inpatient    1985 MRN [id number]   Location [address]  Attending Mayito Fuller,    Hosp Day # 0 PCP Asma M. Siegfried Ros, MD Leotha Leyden

## 2021-05-28 NOTE — ANESTHESIA PREPROCEDURE EVALUATION
Anesthesia PreOp Note    HPI:     Annie Alcocer is a 39year old female who presents for preoperative consultation requested by: * No surgeons listed *    Date of Surgery: 5/28/2021    * No procedures listed *  Indication: * No pre-op diagnosis enter Subcutaneous, PRN, Christiano Disla MD  Sod Citrate-Citric Acid (BICITRA) solution 30 mL, 30 mL, Oral, PRN, Christiano Disla MD  ondansetron HCl (ZOFRAN) injection 4 mg, 4 mg, Intravenous, Q6H PRN, Christiano Disla MD  Ammonia Aromatic (ammonia) nasal solutio Comment: Occas.       Drug use: Yes        Frequency: 7.0 times per week        Types: Cannabis        Comment: occassionally per pt       Sexual activity: Not on file    Other Topics      Concerns:        Not on file    Social History Narrative      Not on Endo/Other    Abdominal   (+) obese,                Anesthesia Plan:   ASA:  2  Emergent    Plan:   Epidural  Informed Consent Plan and Risks Discussed With:  Patient      I have informed Bryan Alcocer and/or legal guardian or family member of the

## 2021-05-28 NOTE — PROGRESS NOTES
Pt is a 39year old female admitted to [place] [address]. Patient presents with:  Srom     Pt is [id number] 37w2d intra-uterine pregnancy. History obtained, consents signed. Oriented to room, staff, and plan of care.

## 2021-05-28 NOTE — H&P
[facility] Patient Status:  Inpatient    1985 MRN [id number]   Location [facility] Attending Dmitry Abel,    Hosp Day # 0 PCP Kenney Cabrales MD     Date Wants FTS      Rh negative state in antepartum period            4/20/21-- last Rhogam on 1/28/21 at 20wks due to            amnio. Pt now 32 wks. Will give Rhogam today            03-12-21  Had Rhogam for procedure at 23w 6d.             Yasmin Smoker        Past Surgerical History:   Past Surgical History:   Procedure Laterality Date   • CHOLECYSTECTOMY         • D & C      x3   •       3   • OOPHORECTOMY Right    • OTHER SURGICAL HISTORY       RT ovary removed after        S 1.010 05/28/2021    PROUR Negative 12/26/2020    GLUUR Negative 12/26/2020    KETUR Negative 12/26/2020    BILUR Negative 12/26/2020    BLOODURINE Negative 12/26/2020    NITRITE Negative 05/28/2021    UROBILINOGEN <2.0 12/26/2020    LEUUR Negative 12/26/20

## 2021-05-28 NOTE — ANESTHESIA PROCEDURE NOTES
Labor Analgesia  Performed by: Colt Jenkins MD  Authorized by: Colt Jenkins MD       General Information and Staff    Start Time:  5/28/2021 2:20 PM  End Time:  5/28/2021 2:37 PM  Anesthesiologist:  Colt Jenkins MD  Performed by:   Wally

## 2021-05-28 NOTE — PROGRESS NOTES
LOF at 0800 today. She is now feeling cramps without coordinated UC's. Spec: gross clear fluid Nitrazine and fern positive. Plan IOL at [facility] now.

## 2021-05-29 NOTE — ANESTHESIA POSTPROCEDURE EVALUATION
Patient: Young Alcocer    Procedure Summary     Date: 05/28/21 Room / Location:     Anesthesia Start: 0537 Anesthesia Stop: 1840    Procedure: LABOR ANALGESIA Diagnosis:     Scheduled Providers:  Anesthesiologist: MD Alba Maldonado

## 2021-05-29 NOTE — PROGRESS NOTES
[facility]    OB/Gyne Post  Progress Note      Curly Alcocer Patient Status:  Inpatient    1985 MRN [id number]   Location [facility] 3SE Attending Kem Case, DO   Hosp Day # 1 PCP MD Batsheva Coulter Immature Granulocyte % 0.4 %       Specimens (From admission, onward)    None          No results found.       Assessment/Plan   39year oldyo [id number] , s/p spontaneous vaginal, PPD# 1   Patient Active Problem List:     Morbid obesity with BMI of 45.0-49

## 2021-05-29 NOTE — CM/SW NOTE
Received MDO for insurance. Patient has Public Aid managed plan through [facility], it has been verified by admitting. Updated Nelson Dunbar RN. No other needs identified at this time.     Alexandr Patrick, [address]

## 2021-05-29 NOTE — PROGRESS NOTES
Received patient into room 354 via WC . Bedside shift report received from [employer]. Pt transferred to bed. Bed in lowest and locked position. Side rails up x2. VSS. IV site unremarkable. Baby present in open crib. ID bands matched with L&D RN.   Milady

## 2021-05-30 VITALS
HEART RATE: 85 BPM | DIASTOLIC BLOOD PRESSURE: 89 MMHG | OXYGEN SATURATION: 99 % | TEMPERATURE: 98 F | RESPIRATION RATE: 18 BRPM | SYSTOLIC BLOOD PRESSURE: 140 MMHG

## 2021-05-30 NOTE — DISCHARGE SUMMARY
[facility]    Discharge Summary    Jah Alcocer Patient Status:  Inpatient    1985 MRN [id number]   Location [facility] 3SE Attending Nael Morning, [facility] Day # 2       Admit date:  2021    Discharge shala Follow-up appointment in 6 weeks with Dr. Natalya Paul

## 2021-05-30 NOTE — PROGRESS NOTES
Discharge instructions given to mom concerning her care for and for baby. All questions answered and patient expressed understanding.

## 2021-06-03 NOTE — TELEPHONE ENCOUNTER
Forms completed and faxed to [facility] specialist at [phone]. Sent [employer].

## 2021-06-10 ENCOUNTER — TELEPHONE (OUTPATIENT)
Dept: OBGYN UNIT | Facility: HOSPITAL | Age: 36
End: 2021-06-10

## 2021-06-22 ENCOUNTER — NST DOCUMENTATION (OUTPATIENT)
Dept: OBGYN CLINIC | Facility: CLINIC | Age: 36
End: 2021-06-22

## 2021-06-23 NOTE — NST
Nonstress Test   Patient: Jah Alcocer    NST date 5/25/21    Diagnosis from order:         NST:         NST DOCUMENTATION 5/25/2021   Variability 6-25 BPM   Accelerations Yes   Decelerations None   Baseline 135   Uterine Irritability No   Contracti

## 2021-07-13 ENCOUNTER — TELEPHONE (OUTPATIENT)
Dept: OBGYN CLINIC | Facility: CLINIC | Age: 36
End: 2021-07-13

## 2021-07-13 NOTE — TELEPHONE ENCOUNTER
Use 10 min -- if no open OB slot then can see when on call between 1-3 pm in order of appts -- warn pt I am on call so time may need to be adjusted

## 2021-07-16 ENCOUNTER — TELEPHONE (OUTPATIENT)
Dept: OBGYN CLINIC | Facility: CLINIC | Age: 36
End: 2021-07-16

## 2021-07-16 ENCOUNTER — POSTPARTUM (OUTPATIENT)
Dept: OBGYN CLINIC | Facility: CLINIC | Age: 36
End: 2021-07-16
Payer: MEDICAID

## 2021-07-16 VITALS
BODY MASS INDEX: 45 KG/M2 | WEIGHT: 244.19 LBS | DIASTOLIC BLOOD PRESSURE: 87 MMHG | SYSTOLIC BLOOD PRESSURE: 121 MMHG | HEART RATE: 67 BPM

## 2021-07-16 DIAGNOSIS — Z30.2 REQUEST FOR STERILIZATION: Primary | ICD-10-CM

## 2021-07-16 PROCEDURE — 3074F SYST BP LT 130 MM HG: CPT | Performed by: OBSTETRICS & GYNECOLOGY

## 2021-07-16 PROCEDURE — H1000 PRENATAL CARE ATRISK ASSESSM: HCPCS | Performed by: OBSTETRICS & GYNECOLOGY

## 2021-07-16 PROCEDURE — 0503F POSTPARTUM CARE VISIT: CPT | Performed by: OBSTETRICS & GYNECOLOGY

## 2021-07-16 PROCEDURE — 3079F DIAST BP 80-89 MM HG: CPT | Performed by: OBSTETRICS & GYNECOLOGY

## 2021-07-16 NOTE — TELEPHONE ENCOUNTER
Please schedule the following surgery:    Procedure: Laparascopic BTL via salpingectomy    Date: after one month due to tubal papers    Diagnosis: desires sterility    Admission:Day surgery    Anesth: general    Preop Medical Clearance needed:  No    PCN a

## 2021-07-16 NOTE — TELEPHONE ENCOUNTER
Pt dropped off additional [facility] paperwork, Pt already paid the $25 fee and filled out ariadna form the first time she dropped off her forms. Pls fax completed form to [phone] or [phone].

## 2021-07-19 PROBLEM — E55.9 VITAMIN D DEFICIENCY: Status: RESOLVED | Noted: 2020-06-29 | Resolved: 2021-07-19

## 2021-07-19 PROBLEM — O09.521 MULTIGRAVIDA OF ADVANCED MATERNAL AGE IN FIRST TRIMESTER: Status: RESOLVED | Noted: 2020-11-16 | Resolved: 2021-07-19

## 2021-07-19 PROBLEM — Z67.91 RH NEGATIVE STATE IN ANTEPARTUM PERIOD (HCC): Status: RESOLVED | Noted: 2020-11-16 | Resolved: 2021-07-19

## 2021-07-19 PROBLEM — O09.521 MULTIGRAVIDA OF ADVANCED MATERNAL AGE IN FIRST TRIMESTER (HCC): Status: RESOLVED | Noted: 2020-11-16 | Resolved: 2021-07-19

## 2021-07-19 PROBLEM — Z34.90 PREGNANCY: Status: RESOLVED | Noted: 2021-05-28 | Resolved: 2021-07-19

## 2021-07-19 PROBLEM — O26.899 RH NEGATIVE STATE IN ANTEPARTUM PERIOD: Status: RESOLVED | Noted: 2020-11-16 | Resolved: 2021-07-19

## 2021-07-19 PROBLEM — Z67.91 RH NEGATIVE STATE IN ANTEPARTUM PERIOD: Status: RESOLVED | Noted: 2020-11-16 | Resolved: 2021-07-19

## 2021-07-19 PROBLEM — O26.899 RH NEGATIVE STATE IN ANTEPARTUM PERIOD (HCC): Status: RESOLVED | Noted: 2020-11-16 | Resolved: 2021-07-19

## 2021-07-19 PROBLEM — Z13.79 GENETIC TESTING: Status: RESOLVED | Noted: 2020-11-16 | Resolved: 2021-07-19

## 2021-07-19 PROBLEM — E66.01 MORBID OBESITY WITH BMI OF 45.0-49.9, ADULT (HCC): Status: RESOLVED | Noted: 2017-07-06 | Resolved: 2021-07-19

## 2021-07-19 PROBLEM — Z34.90 PREGNANCY (HCC): Status: RESOLVED | Noted: 2021-05-28 | Resolved: 2021-07-19

## 2021-07-19 PROBLEM — O28.3 ABNORMAL FETAL ULTRASOUND: Status: RESOLVED | Noted: 2021-03-14 | Resolved: 2021-07-19

## 2021-07-19 PROBLEM — E66.01 OBESITY, CLASS III, BMI 40-49.9 (MORBID OBESITY) (HCC): Status: RESOLVED | Noted: 2020-01-01 | Resolved: 2021-07-19

## 2021-07-19 PROBLEM — E66.813 OBESITY, CLASS III, BMI 40-49.9 (MORBID OBESITY): Status: RESOLVED | Noted: 2020-01-01 | Resolved: 2021-07-19

## 2021-07-19 NOTE — TELEPHONE ENCOUNTER
Spoke to pt.  Aware surgery is scheduled on Monday,08/23/21 at 7:30am.    States works at [address] [place] [address] [facility] [employer] does some heavy lifting would like to know if we can send a letter via [employer] for work including recovery time    Receive sterilization consen

## 2021-07-19 NOTE — TELEPHONE ENCOUNTER
Would you like case to be scheduled on the AM of Monday,08/23/21 you will be post call and can ask EFRAÍNK to assist

## 2021-07-19 NOTE — PROGRESS NOTES
Luciano Alcocer is a 39year old female [id number] here for 6 week post-partum visit. Patient delivered a  male infant on 5/28/2021 via spontaneous vaginal delivery. Patient desires tubal for contraception. Patient is bottle feeding.    Patient denies Known Allergies    PHYSICAL EXAM  /87   Pulse 67   Wt 244 lb 3.2 oz (110.8 kg)   LMP 07/09/2021   BMI 44.66 kg/m²   General:    well nourished, well developed woman in no acute distress  Abdomen:    soft, nontender, no masses  External Genitalia:  no

## 2021-07-22 NOTE — TELEPHONE ENCOUNTER
Pt called back and asked to hold onto the ADA form that she dropped off on 7/16/21. She will bring in another form today which should have the dates of 5/28/21 - RTW on 7/17/21 (Pregnancy delivery info). This form is due by 8/3/21. Please place in my box.

## 2021-07-22 NOTE — TELEPHONE ENCOUNTER
Spoke to pt in regards to the [employer] form but pt seemed confused on what it was for. She will contact her HR and then call the Forms Dept. She stated that she is part-time so she's not eligible for paid disability.  Pt also has an upcoming surg on 8/23/2

## 2021-08-20 ENCOUNTER — LAB ENCOUNTER (OUTPATIENT)
Dept: LAB | Age: 36
End: 2021-08-20
Attending: OBSTETRICS & GYNECOLOGY
Payer: MEDICAID

## 2021-08-20 DIAGNOSIS — Z01.818 PREOP TESTING: ICD-10-CM

## 2021-08-20 NOTE — H&P
[facility] Patient Status:  Hospital Outpatient Surgery    1985 MRN [id number]   Location [place] [address] Attending Sagrario Maya MD   Hosp Day # 0 PCP Kenney Blood 1 Term 05/01/02 41w0d  6 lb 11 oz (3.033 kg) M NORMAL SPONT EPI  LUIS        Past GYN History:   Periods: monthly  Hx STD: trich  BCM: none    Social History:  Social History    Tobacco Use      Smoking status: Former Smoker        Packs/day: 0.50 Diagnostics:  No results found. Assessment/Plan:     Multiparity, desires permanent sterility    For laparascopic bilateral salpingectomy. All of the findings and plan were discussed with the patient.   She notes understanding and agrees with

## 2021-08-21 LAB — SARS-COV-2 RNA RESP QL NAA+PROBE: NOT DETECTED

## 2021-08-23 ENCOUNTER — ANESTHESIA (OUTPATIENT)
Dept: SURGERY | Facility: HOSPITAL | Age: 36
End: 2021-08-23
Payer: MEDICAID

## 2021-08-23 ENCOUNTER — HOSPITAL ENCOUNTER (OUTPATIENT)
Facility: HOSPITAL | Age: 36
Setting detail: HOSPITAL OUTPATIENT SURGERY
Discharge: HOME | End: 2021-08-23
Attending: OBSTETRICS & GYNECOLOGY | Admitting: OBSTETRICS & GYNECOLOGY
Payer: MEDICAID

## 2021-08-23 ENCOUNTER — ANESTHESIA EVENT (OUTPATIENT)
Dept: SURGERY | Facility: HOSPITAL | Age: 36
End: 2021-08-23
Payer: MEDICAID

## 2021-08-23 VITALS
RESPIRATION RATE: 16 BRPM | BODY MASS INDEX: 46.17 KG/M2 | HEART RATE: 50 BPM | DIASTOLIC BLOOD PRESSURE: 70 MMHG | WEIGHT: 250.88 LBS | OXYGEN SATURATION: 100 % | HEIGHT: 62 IN | TEMPERATURE: 98 F | SYSTOLIC BLOOD PRESSURE: 121 MMHG

## 2021-08-23 DIAGNOSIS — Z30.2 REQUEST FOR STERILIZATION: ICD-10-CM

## 2021-08-23 DIAGNOSIS — Z01.818 PREOP TESTING: Primary | ICD-10-CM

## 2021-08-23 LAB — B-HCG UR QL: NEGATIVE

## 2021-08-23 PROCEDURE — 58661 LAPAROSCOPY REMOVE ADNEXA: CPT | Performed by: OBSTETRICS & GYNECOLOGY

## 2021-08-23 PROCEDURE — 0UT54ZZ RESECTION OF RIGHT FALLOPIAN TUBE, PERCUTANEOUS ENDOSCOPIC APPROACH: ICD-10-PCS | Performed by: OBSTETRICS & GYNECOLOGY

## 2021-08-23 RX ORDER — MORPHINE SULFATE 10 MG/ML
6 INJECTION, SOLUTION INTRAMUSCULAR; INTRAVENOUS EVERY 10 MIN PRN
Status: DISCONTINUED | OUTPATIENT
Start: 2021-08-23 | End: 2021-08-23

## 2021-08-23 RX ORDER — KETOROLAC TROMETHAMINE 30 MG/ML
30 INJECTION, SOLUTION INTRAMUSCULAR; INTRAVENOUS EVERY 6 HOURS
Status: DISCONTINUED | OUTPATIENT
Start: 2021-08-23 | End: 2021-08-23

## 2021-08-23 RX ORDER — HYDROMORPHONE HYDROCHLORIDE 1 MG/ML
0.6 INJECTION, SOLUTION INTRAMUSCULAR; INTRAVENOUS; SUBCUTANEOUS EVERY 5 MIN PRN
Status: DISCONTINUED | OUTPATIENT
Start: 2021-08-23 | End: 2021-08-23

## 2021-08-23 RX ORDER — MORPHINE SULFATE 4 MG/ML
4 INJECTION, SOLUTION INTRAMUSCULAR; INTRAVENOUS EVERY 10 MIN PRN
Status: DISCONTINUED | OUTPATIENT
Start: 2021-08-23 | End: 2021-08-23

## 2021-08-23 RX ORDER — ONDANSETRON 2 MG/ML
4 INJECTION INTRAMUSCULAR; INTRAVENOUS ONCE AS NEEDED
Status: COMPLETED | OUTPATIENT
Start: 2021-08-23 | End: 2021-08-23

## 2021-08-23 RX ORDER — LIDOCAINE HYDROCHLORIDE 10 MG/ML
INJECTION, SOLUTION EPIDURAL; INFILTRATION; INTRACAUDAL; PERINEURAL AS NEEDED
Status: DISCONTINUED | OUTPATIENT
Start: 2021-08-23 | End: 2021-08-23 | Stop reason: SURG

## 2021-08-23 RX ORDER — KETOROLAC TROMETHAMINE 15 MG/ML
15 INJECTION, SOLUTION INTRAMUSCULAR; INTRAVENOUS EVERY 6 HOURS
Status: DISCONTINUED | OUTPATIENT
Start: 2021-08-23 | End: 2021-08-23

## 2021-08-23 RX ORDER — ROCURONIUM BROMIDE 10 MG/ML
INJECTION, SOLUTION INTRAVENOUS AS NEEDED
Status: DISCONTINUED | OUTPATIENT
Start: 2021-08-23 | End: 2021-08-23 | Stop reason: SURG

## 2021-08-23 RX ORDER — SODIUM CHLORIDE, SODIUM LACTATE, POTASSIUM CHLORIDE, CALCIUM CHLORIDE 600; 310; 30; 20 MG/100ML; MG/100ML; MG/100ML; MG/100ML
INJECTION, SOLUTION INTRAVENOUS CONTINUOUS
Status: DISCONTINUED | OUTPATIENT
Start: 2021-08-23 | End: 2021-08-23

## 2021-08-23 RX ORDER — DEXAMETHASONE SODIUM PHOSPHATE 4 MG/ML
VIAL (ML) INJECTION AS NEEDED
Status: DISCONTINUED | OUTPATIENT
Start: 2021-08-23 | End: 2021-08-23 | Stop reason: SURG

## 2021-08-23 RX ORDER — HYDROMORPHONE HYDROCHLORIDE 1 MG/ML
0.2 INJECTION, SOLUTION INTRAMUSCULAR; INTRAVENOUS; SUBCUTANEOUS EVERY 5 MIN PRN
Status: DISCONTINUED | OUTPATIENT
Start: 2021-08-23 | End: 2021-08-23

## 2021-08-23 RX ORDER — MIDAZOLAM HYDROCHLORIDE 1 MG/ML
INJECTION INTRAMUSCULAR; INTRAVENOUS AS NEEDED
Status: DISCONTINUED | OUTPATIENT
Start: 2021-08-23 | End: 2021-08-23 | Stop reason: SURG

## 2021-08-23 RX ORDER — GLYCOPYRROLATE 0.2 MG/ML
INJECTION, SOLUTION INTRAMUSCULAR; INTRAVENOUS AS NEEDED
Status: DISCONTINUED | OUTPATIENT
Start: 2021-08-23 | End: 2021-08-23 | Stop reason: SURG

## 2021-08-23 RX ORDER — BUPIVACAINE HYDROCHLORIDE 2.5 MG/ML
INJECTION, SOLUTION EPIDURAL; INFILTRATION; INTRACAUDAL AS NEEDED
Status: DISCONTINUED | OUTPATIENT
Start: 2021-08-23 | End: 2021-08-23 | Stop reason: HOSPADM

## 2021-08-23 RX ORDER — HYDROMORPHONE HYDROCHLORIDE 1 MG/ML
0.4 INJECTION, SOLUTION INTRAMUSCULAR; INTRAVENOUS; SUBCUTANEOUS EVERY 5 MIN PRN
Status: DISCONTINUED | OUTPATIENT
Start: 2021-08-23 | End: 2021-08-23

## 2021-08-23 RX ORDER — PROCHLORPERAZINE EDISYLATE 5 MG/ML
5 INJECTION INTRAMUSCULAR; INTRAVENOUS ONCE AS NEEDED
Status: DISCONTINUED | OUTPATIENT
Start: 2021-08-23 | End: 2021-08-23

## 2021-08-23 RX ORDER — HYDROCODONE BITARTRATE AND ACETAMINOPHEN 5; 325 MG/1; MG/1
1 TABLET ORAL EVERY 6 HOURS PRN
Qty: 15 TABLET | Refills: 0 | Status: SHIPPED | OUTPATIENT
Start: 2021-08-23 | End: 2021-11-30 | Stop reason: ALTCHOICE

## 2021-08-23 RX ORDER — ONDANSETRON 2 MG/ML
INJECTION INTRAMUSCULAR; INTRAVENOUS AS NEEDED
Status: DISCONTINUED | OUTPATIENT
Start: 2021-08-23 | End: 2021-08-23 | Stop reason: SURG

## 2021-08-23 RX ORDER — NEOSTIGMINE METHYLSULFATE 1 MG/ML
INJECTION INTRAVENOUS AS NEEDED
Status: DISCONTINUED | OUTPATIENT
Start: 2021-08-23 | End: 2021-08-23 | Stop reason: SURG

## 2021-08-23 RX ORDER — MORPHINE SULFATE 4 MG/ML
2 INJECTION, SOLUTION INTRAMUSCULAR; INTRAVENOUS EVERY 10 MIN PRN
Status: DISCONTINUED | OUTPATIENT
Start: 2021-08-23 | End: 2021-08-23

## 2021-08-23 RX ORDER — METOCLOPRAMIDE 10 MG/1
10 TABLET ORAL ONCE
Status: COMPLETED | OUTPATIENT
Start: 2021-08-23 | End: 2021-08-23

## 2021-08-23 RX ORDER — FAMOTIDINE 20 MG/1
20 TABLET ORAL ONCE
Status: COMPLETED | OUTPATIENT
Start: 2021-08-23 | End: 2021-08-23

## 2021-08-23 RX ORDER — HYDROCODONE BITARTRATE AND ACETAMINOPHEN 5; 325 MG/1; MG/1
1 TABLET ORAL AS NEEDED
Status: DISCONTINUED | OUTPATIENT
Start: 2021-08-23 | End: 2021-08-23

## 2021-08-23 RX ORDER — HALOPERIDOL 5 MG/ML
0.25 INJECTION INTRAMUSCULAR ONCE AS NEEDED
Status: DISCONTINUED | OUTPATIENT
Start: 2021-08-23 | End: 2021-08-23

## 2021-08-23 RX ORDER — NALOXONE HYDROCHLORIDE 0.4 MG/ML
80 INJECTION, SOLUTION INTRAMUSCULAR; INTRAVENOUS; SUBCUTANEOUS AS NEEDED
Status: DISCONTINUED | OUTPATIENT
Start: 2021-08-23 | End: 2021-08-23

## 2021-08-23 RX ORDER — HYDROCODONE BITARTRATE AND ACETAMINOPHEN 5; 325 MG/1; MG/1
2 TABLET ORAL AS NEEDED
Status: DISCONTINUED | OUTPATIENT
Start: 2021-08-23 | End: 2021-08-23

## 2021-08-23 RX ORDER — ACETAMINOPHEN 500 MG
1000 TABLET ORAL ONCE
Status: COMPLETED | OUTPATIENT
Start: 2021-08-23 | End: 2021-08-23

## 2021-08-23 RX ADMIN — ONDANSETRON 4 MG: 2 INJECTION INTRAMUSCULAR; INTRAVENOUS at 08:19:00

## 2021-08-23 RX ADMIN — MIDAZOLAM HYDROCHLORIDE 2 MG: 1 INJECTION INTRAMUSCULAR; INTRAVENOUS at 07:29:00

## 2021-08-23 RX ADMIN — GLYCOPYRROLATE 0.2 MG: 0.2 INJECTION, SOLUTION INTRAMUSCULAR; INTRAVENOUS at 07:29:00

## 2021-08-23 RX ADMIN — SODIUM CHLORIDE, SODIUM LACTATE, POTASSIUM CHLORIDE, CALCIUM CHLORIDE: 600; 310; 30; 20 INJECTION, SOLUTION INTRAVENOUS at 08:20:00

## 2021-08-23 RX ADMIN — SODIUM CHLORIDE, SODIUM LACTATE, POTASSIUM CHLORIDE, CALCIUM CHLORIDE: 600; 310; 30; 20 INJECTION, SOLUTION INTRAVENOUS at 08:41:00

## 2021-08-23 RX ADMIN — LIDOCAINE HYDROCHLORIDE 50 MG: 10 INJECTION, SOLUTION EPIDURAL; INFILTRATION; INTRACAUDAL; PERINEURAL at 07:35:00

## 2021-08-23 RX ADMIN — DEXAMETHASONE SODIUM PHOSPHATE 8 MG: 4 MG/ML VIAL (ML) INJECTION at 07:39:00

## 2021-08-23 RX ADMIN — ROCURONIUM BROMIDE 50 MG: 10 INJECTION, SOLUTION INTRAVENOUS at 07:36:00

## 2021-08-23 RX ADMIN — GLYCOPYRROLATE 0.6 MG: 0.2 INJECTION, SOLUTION INTRAMUSCULAR; INTRAVENOUS at 08:22:00

## 2021-08-23 RX ADMIN — NEOSTIGMINE METHYLSULFATE 5 MG: 1 INJECTION INTRAVENOUS at 08:22:00

## 2021-08-23 NOTE — ANESTHESIA PROCEDURE NOTES
Airway  Date/Time: 8/23/2021 7:37 AM  Urgency: Elective    Airway not difficult    General Information and Staff    Patient location during procedure: OR  Anesthesiologist: Tiago Jennings MD  Resident/CRNA: Reynaldo Cuadra CRNA  Performed: CRNA     Indication

## 2021-08-23 NOTE — OPERATIVE REPORT
[facility] OPERATING ROOM  Operative Note     Tonya Alcocer Location: OR   [facility] [id number] MRN [id number]   Admission Date 8/23/2021 Operation Date 8/23/2021   Attending Physician Keesha Curiel MD Operating Physician Rosalva Vasquez MD      Pr dissection of subcutaneous area done. Kocher's used to grasp fascia & incision made under direct visualization. Corners tagged with 0 Vicryl & attached to Rodriguez trocar. CO2 gas activated & camera inserted confirming above findings.  The patient was placed

## 2021-08-23 NOTE — ANESTHESIA POSTPROCEDURE EVALUATION
Patient: Josue Alcocer    Procedure Summary     Date: 08/23/21 Room / Location: [facility] OR  / [facility] OR    Anesthesia Start: 8181 Anesthesia Stop: 0716    Procedure: laparoscopic RIGHT tubal ligation salpingectomy (N/A ) Diagnosis:       Request

## 2021-08-23 NOTE — ANESTHESIA PREPROCEDURE EVALUATION
Anesthesia PreOp Note    HPI:     Rock Victor M Alcocer is a 39year old female who presents for preoperative consultation requested by: Radha Velasquez MD    Date of Surgery: 8/23/2021    Procedure(s):  laparoscopic biateral tubal ligation salpingectomy  In Tobacco Use      Smoking status: Former Smoker        Packs/day: 0.50        Years: 13.00        Pack years: 6.5      Smokeless tobacco: Never Used      Tobacco comment: social prior to pregnancy. 2-3 cigs/day.  stops during pregnancy     Substance and Sexu °F (36.8 °C). Her blood pressure is 123/77 and her pulse is 65. Her respiration is 18 and oxygen saturation is 96%.     08/18/21  1130 08/23/21  0627   BP:  123/77   Pulse:  65   Resp:  18   Temp:  98.2 °F (36.8 °C)   TempSrc:  Oral   SpO2:  96%   Weight: 1

## 2021-08-23 NOTE — INTERVAL H&P NOTE
Pre-op Diagnosis: Request for sterilization [Z30.2]    The above referenced H&P was reviewed by Kathy Linares MD on 8/23/2021, the patient was examined and no significant changes have occurred in the patient's condition since the H&P was performed.   I di

## 2021-09-02 ENCOUNTER — TELEPHONE (OUTPATIENT)
Dept: OBGYN CLINIC | Facility: CLINIC | Age: 36
End: 2021-09-02

## 2021-09-02 NOTE — TELEPHONE ENCOUNTER
Patient had procedure done two weeks ago with [facility] and forget to schedule a follow up appt. . Patient wants to be seeing ASAP .

## 2021-09-02 NOTE — TELEPHONE ENCOUNTER
Pt had laparoscopic right tubal ligation with JACQUELINE 8/23. She forgot to schedule a 2 week post op appt. 2 weeks falls on labor day. [facility] has no openings that week. Routed to [facility], do you want to add her somewhere? Thank you.

## 2021-10-05 NOTE — TELEPHONE ENCOUNTER
Called pt to f/u on pending form. Pt states she dropped it off 7/24/21, after going through our email and our original bin we never got it in Forms Dept. Pt was very upset this was not completed as it has caused her issues with her job.  I apologized and as

## 2021-11-30 ENCOUNTER — HOSPITAL ENCOUNTER (OUTPATIENT)
Age: 36
Discharge: HOME | End: 2021-11-30
Payer: MEDICAID

## 2021-11-30 VITALS
HEIGHT: 62 IN | WEIGHT: 245 LBS | SYSTOLIC BLOOD PRESSURE: 139 MMHG | TEMPERATURE: 99 F | HEART RATE: 71 BPM | RESPIRATION RATE: 12 BRPM | DIASTOLIC BLOOD PRESSURE: 88 MMHG | BODY MASS INDEX: 45.08 KG/M2 | OXYGEN SATURATION: 99 %

## 2021-11-30 DIAGNOSIS — K08.89 PAIN, DENTAL: Primary | ICD-10-CM

## 2021-11-30 PROCEDURE — 99203 OFFICE O/P NEW LOW 30 MIN: CPT | Performed by: PHYSICIAN ASSISTANT

## 2021-11-30 RX ORDER — AMOXICILLIN 875 MG/1
875 TABLET, COATED ORAL 2 TIMES DAILY
Qty: 20 TABLET | Refills: 0 | Status: SHIPPED | OUTPATIENT
Start: 2021-11-30 | End: 2021-12-10

## 2021-11-30 RX ORDER — IBUPROFEN 600 MG/1
600 TABLET ORAL EVERY 8 HOURS PRN
Qty: 30 TABLET | Refills: 0 | Status: SHIPPED | OUTPATIENT
Start: 2021-11-30 | End: 2021-12-07

## 2021-11-30 NOTE — ED PROVIDER NOTES
Patient Seen in: [facility] [address] [place] [address]      History   Patient presents with:  Dental Problem    Stated Complaint: toothache    Subjective:   HPI    25-year-old female who comes in today complaining of left upper dental pain she states that she Wt 111.1 kg   LMP 11/02/2021   SpO2 99%   BMI 44.81 kg/m²         Physical Exam  Vitals and nursing note reviewed. Constitutional:       General: She is not in acute distress. Appearance: She is well-developed. She is not diaphoretic.    HENT:      He understanding.  All questions and concerns are addressed to the patient's satisfaction prior to discharge today.         Disposition and Plan     Clinical Impression:  Pain, dental  (primary encounter diagnosis)     Disposition:  Discharge  11/30/2021 10:30

## 2022-01-03 ENCOUNTER — HOSPITAL ENCOUNTER (OUTPATIENT)
Age: 37
Discharge: HOME | End: 2022-01-03
Attending: NURSE PRACTITIONER
Payer: MEDICAID

## 2022-01-03 VITALS
RESPIRATION RATE: 18 BRPM | DIASTOLIC BLOOD PRESSURE: 82 MMHG | TEMPERATURE: 99 F | WEIGHT: 244.94 LBS | SYSTOLIC BLOOD PRESSURE: 143 MMHG | OXYGEN SATURATION: 100 % | HEART RATE: 80 BPM | BODY MASS INDEX: 45 KG/M2

## 2022-01-03 DIAGNOSIS — K08.89 TOOTH PAIN: Primary | ICD-10-CM

## 2022-01-03 DIAGNOSIS — K02.9 TOOTH DECAY: ICD-10-CM

## 2022-01-03 PROCEDURE — 99214 OFFICE O/P EST MOD 30 MIN: CPT | Performed by: NURSE PRACTITIONER

## 2022-01-03 RX ORDER — AMOXICILLIN AND CLAVULANATE POTASSIUM 875; 125 MG/1; MG/1
1 TABLET, FILM COATED ORAL 2 TIMES DAILY
Qty: 20 TABLET | Refills: 0 | Status: SHIPPED | OUTPATIENT
Start: 2022-01-03 | End: 2022-01-13

## 2022-01-04 NOTE — ED PROVIDER NOTES
Patient presents with:  Dental Problem      HPI:    Vivian Alcocer is a 39year old female presents with dental pain. Left upper molar. She was recently seen for same problem and treated with Augmentin.  She started a new job and has not been able to s stiffness. CARDIOVASCULAR: denies chest pain on exertion or rest..     EXAM:   /82   Pulse 80   Temp 98.6 °F (37 °C) (Temporal)   Resp 18   Wt 111.1 kg   LMP 01/03/2022   SpO2 100%   BMI 44.80 kg/m²   GENERAL: well developed, well nourished,in no endy

## 2022-03-15 ENCOUNTER — HOSPITAL ENCOUNTER (OUTPATIENT)
Age: 37
Discharge: HOME | End: 2022-03-15
Payer: MEDICAID

## 2022-03-15 VITALS
BODY MASS INDEX: 42.33 KG/M2 | OXYGEN SATURATION: 100 % | WEIGHT: 230 LBS | HEART RATE: 84 BPM | RESPIRATION RATE: 18 BRPM | TEMPERATURE: 99 F | DIASTOLIC BLOOD PRESSURE: 87 MMHG | HEIGHT: 62 IN | SYSTOLIC BLOOD PRESSURE: 140 MMHG

## 2022-03-15 DIAGNOSIS — A08.4 STOMACH FLU: Primary | ICD-10-CM

## 2022-03-15 DIAGNOSIS — R11.2 NAUSEA VOMITING AND DIARRHEA: ICD-10-CM

## 2022-03-15 DIAGNOSIS — R19.7 NAUSEA VOMITING AND DIARRHEA: ICD-10-CM

## 2022-03-15 LAB
B-HCG UR QL: NEGATIVE
POCT BILIRUBIN URINE: NEGATIVE
POCT BLOOD URINE: NEGATIVE
POCT GLUCOSE URINE: NEGATIVE MG/DL
POCT KETONE URINE: NEGATIVE MG/DL
POCT LEUKOCYTE ESTERASE URINE: NEGATIVE
POCT NITRITE URINE: NEGATIVE
POCT PH URINE: 8.5 (ref 5–8)
POCT PROTEIN URINE: NEGATIVE MG/DL
POCT SPECIFIC GRAVITY URINE: 1.03
POCT URINE CLARITY: CLEAR
POCT URINE COLOR: YELLOW
POCT UROBILINOGEN URINE: 1 MG/DL

## 2022-03-15 PROCEDURE — 81002 URINALYSIS NONAUTO W/O SCOPE: CPT | Performed by: PHYSICIAN ASSISTANT

## 2022-03-15 PROCEDURE — 81025 URINE PREGNANCY TEST: CPT | Performed by: PHYSICIAN ASSISTANT

## 2022-03-15 PROCEDURE — 99213 OFFICE O/P EST LOW 20 MIN: CPT | Performed by: PHYSICIAN ASSISTANT

## 2022-03-15 RX ORDER — ONDANSETRON 4 MG/1
4 TABLET, ORALLY DISINTEGRATING ORAL EVERY 4 HOURS PRN
Qty: 20 TABLET | Refills: 0 | Status: SHIPPED | OUTPATIENT
Start: 2022-03-15

## 2022-03-16 NOTE — ED INITIAL ASSESSMENT (HPI)
Pt c/o body aches, nausea and fatigue; Pt son was seen in [address] [place] [address] for gastritis and concerned she has infection also.  Symptoms started this afternoon when she picked her son up from

## 2022-04-01 ENCOUNTER — HOSPITAL ENCOUNTER (OUTPATIENT)
Age: 37
Discharge: HOME | End: 2022-04-01
Payer: MEDICAID

## 2022-04-01 VITALS
TEMPERATURE: 98 F | WEIGHT: 240 LBS | HEIGHT: 62 IN | OXYGEN SATURATION: 98 % | SYSTOLIC BLOOD PRESSURE: 127 MMHG | HEART RATE: 73 BPM | RESPIRATION RATE: 14 BRPM | BODY MASS INDEX: 44.16 KG/M2 | DIASTOLIC BLOOD PRESSURE: 85 MMHG

## 2022-04-01 DIAGNOSIS — J06.9 VIRAL URI WITH COUGH: Primary | ICD-10-CM

## 2022-04-01 DIAGNOSIS — H10.31 ACUTE CONJUNCTIVITIS OF RIGHT EYE, UNSPECIFIED ACUTE CONJUNCTIVITIS TYPE: ICD-10-CM

## 2022-04-01 PROCEDURE — 99212 OFFICE O/P EST SF 10 MIN: CPT | Performed by: PHYSICIAN ASSISTANT

## 2022-04-01 RX ORDER — CIPROFLOXACIN HYDROCHLORIDE 3.5 MG/ML
2 SOLUTION/ DROPS TOPICAL
Qty: 5 ML | Refills: 0 | Status: SHIPPED | OUTPATIENT
Start: 2022-04-01 | End: 2022-04-08

## 2022-04-03 ENCOUNTER — HOSPITAL ENCOUNTER (OUTPATIENT)
Age: 37
Discharge: HOME | End: 2022-04-03
Payer: MEDICAID

## 2022-04-03 VITALS
DIASTOLIC BLOOD PRESSURE: 100 MMHG | SYSTOLIC BLOOD PRESSURE: 144 MMHG | TEMPERATURE: 97 F | HEART RATE: 73 BPM | OXYGEN SATURATION: 97 % | RESPIRATION RATE: 20 BRPM

## 2022-04-03 DIAGNOSIS — L03.213 PRESEPTAL CELLULITIS OF RIGHT EYE: ICD-10-CM

## 2022-04-03 DIAGNOSIS — H10.33 ACUTE CONJUNCTIVITIS OF BOTH EYES, UNSPECIFIED ACUTE CONJUNCTIVITIS TYPE: Primary | ICD-10-CM

## 2022-04-03 PROCEDURE — 99213 OFFICE O/P EST LOW 20 MIN: CPT | Performed by: NURSE PRACTITIONER

## 2022-04-03 RX ORDER — TOBRAMYCIN 3 MG/ML
2 SOLUTION/ DROPS OPHTHALMIC EVERY 6 HOURS
Qty: 10 ML | Refills: 0 | Status: SHIPPED | OUTPATIENT
Start: 2022-04-03 | End: 2022-04-10

## 2022-04-03 RX ORDER — CEPHALEXIN 500 MG/1
500 CAPSULE ORAL 4 TIMES DAILY
Qty: 28 CAPSULE | Refills: 0 | Status: SHIPPED | OUTPATIENT
Start: 2022-04-03 | End: 2022-04-10

## 2022-04-03 RX ORDER — OLOPATADINE HYDROCHLORIDE 2 MG/ML
1 SOLUTION/ DROPS OPHTHALMIC DAILY
Qty: 2.5 ML | Refills: 0 | Status: SHIPPED | OUTPATIENT
Start: 2022-04-03

## 2022-04-03 NOTE — ED INITIAL ASSESSMENT (HPI)
PT C/O WATERY,ITCHY AND RED EYES. PT STATES HER SYMPTOMS STARTED Friday. PT STATES SHE WAS SEEN HERE AT [address] NOT BETTER. PT C/O SWELLING AROUND HER EYES AND BLURRED VISION FROM HER EYES WATERING.

## 2022-04-05 ENCOUNTER — OFFICE VISIT (OUTPATIENT)
Dept: FAMILY MEDICINE CLINIC | Facility: CLINIC | Age: 37
End: 2022-04-05
Payer: MEDICAID

## 2022-04-05 ENCOUNTER — TELEPHONE (OUTPATIENT)
Dept: FAMILY MEDICINE CLINIC | Facility: CLINIC | Age: 37
End: 2022-04-05

## 2022-04-05 ENCOUNTER — TELEPHONE (OUTPATIENT)
Dept: OPHTHALMOLOGY | Facility: CLINIC | Age: 37
End: 2022-04-05

## 2022-04-05 VITALS
SYSTOLIC BLOOD PRESSURE: 116 MMHG | BODY MASS INDEX: 45.45 KG/M2 | TEMPERATURE: 98 F | DIASTOLIC BLOOD PRESSURE: 79 MMHG | HEIGHT: 62 IN | HEART RATE: 106 BPM | RESPIRATION RATE: 20 BRPM | WEIGHT: 247 LBS

## 2022-04-05 DIAGNOSIS — H10.9 CONJUNCTIVITIS OF BOTH EYES, UNSPECIFIED CONJUNCTIVITIS TYPE: Primary | ICD-10-CM

## 2022-04-05 PROCEDURE — 3008F BODY MASS INDEX DOCD: CPT | Performed by: FAMILY MEDICINE

## 2022-04-05 PROCEDURE — 3078F DIAST BP <80 MM HG: CPT | Performed by: FAMILY MEDICINE

## 2022-04-05 PROCEDURE — 3074F SYST BP LT 130 MM HG: CPT | Performed by: FAMILY MEDICINE

## 2022-04-05 PROCEDURE — 99213 OFFICE O/P EST LOW 20 MIN: CPT | Performed by: FAMILY MEDICINE

## 2022-04-05 RX ORDER — AMOXICILLIN 500 MG/1
500 TABLET, FILM COATED ORAL EVERY 8 HOURS
COMMUNITY
Start: 2022-03-01 | End: 2022-04-05

## 2022-04-05 NOTE — TELEPHONE ENCOUNTER
Per pt has infection in both eyes that is not improving, has been prescribed two types of eye drops but eyes are only getting worse, asking for appt soon. Please advise thank you.

## 2022-04-05 NOTE — TELEPHONE ENCOUNTER
Patient went to IC on 4/1 and went back 4/3/22. Started with what appeared to be pink eye. Then worsened and still has symptoms. Itching, watery bilateral eyes. 4/1 given cipro eye drops discontinued. 4/3 given tobramycin eye drops every 6 hours, olopatadine, and oral cephalexin     Appt made today with Dr Jeannette Lima to further evaluate.

## 2022-04-06 ENCOUNTER — OFFICE VISIT (OUTPATIENT)
Dept: OPHTHALMOLOGY | Facility: CLINIC | Age: 37
End: 2022-04-06
Payer: MEDICAID

## 2022-04-06 DIAGNOSIS — H10.9 CONJUNCTIVITIS OF BOTH EYES, UNSPECIFIED CONJUNCTIVITIS TYPE: Primary | ICD-10-CM

## 2022-04-06 PROBLEM — H10.33 ACUTE CONJUNCTIVITIS OF BOTH EYES: Status: ACTIVE | Noted: 2022-04-06

## 2022-04-06 PROCEDURE — 99203 OFFICE O/P NEW LOW 30 MIN: CPT | Performed by: OPHTHALMOLOGY

## 2022-04-06 RX ORDER — PREDNISOLONE ACETATE 10 MG/ML
SUSPENSION/ DROPS OPHTHALMIC
Qty: 1 EACH | Refills: 0 | Status: SHIPPED | OUTPATIENT
Start: 2022-04-06 | End: 2022-04-13

## 2022-04-06 RX ORDER — TOBRAMYCIN 3 MG/ML
2 SOLUTION/ DROPS OPHTHALMIC EVERY 6 HOURS
COMMUNITY
End: 2022-04-06

## 2022-04-06 NOTE — PATIENT INSTRUCTIONS
Acute conjunctivitis of both eyes  STOP Tobramycin drops. Continue Cephalexin orally as directed. Told patient that she does not have to get Olopatadine drops (she has not gotten them yet)   START Pred forte drops- use 1 drop 4 times a day in both eyes for 1 week, then discontinue. Use warm compresses for discharge and crusting. Use cool compresses as needed for swelling and comfort. .   Warned of contagion. Patient should call our office and make an appointment for a return visit if symptoms worsen or do not begin to improve.

## 2022-04-06 NOTE — ASSESSMENT & PLAN NOTE
STOP Tobramycin drops. Continue Cephalexin orally as directed. Told patient that she does not have to get Olopatadine drops (she has not gotten them yet)   START Pred forte drops- use 1 drop 4 times a day in both eyes for 1 week, then discontinue. Use warm compresses for discharge and crusting. Use cool compresses as needed for swelling and comfort. .   Warned of contagion. Patient should call our office and make an appointment for a return visit if symptoms worsen or do not begin to improve.

## 2022-07-26 ENCOUNTER — HOSPITAL ENCOUNTER (OUTPATIENT)
Age: 37
Discharge: HOME | End: 2022-07-26
Payer: MEDICAID

## 2022-07-26 VITALS
OXYGEN SATURATION: 98 % | BODY MASS INDEX: 43.24 KG/M2 | WEIGHT: 235 LBS | HEART RATE: 88 BPM | TEMPERATURE: 98 F | HEIGHT: 62 IN | SYSTOLIC BLOOD PRESSURE: 134 MMHG | DIASTOLIC BLOOD PRESSURE: 98 MMHG | RESPIRATION RATE: 16 BRPM

## 2022-07-26 DIAGNOSIS — R19.7 NAUSEA VOMITING AND DIARRHEA: Primary | ICD-10-CM

## 2022-07-26 DIAGNOSIS — R11.2 NAUSEA VOMITING AND DIARRHEA: Primary | ICD-10-CM

## 2022-07-26 PROCEDURE — 99213 OFFICE O/P EST LOW 20 MIN: CPT | Performed by: NURSE PRACTITIONER

## 2022-07-26 RX ORDER — ONDANSETRON 4 MG/1
4 TABLET, ORALLY DISINTEGRATING ORAL ONCE
Status: COMPLETED | OUTPATIENT
Start: 2022-07-26 | End: 2022-07-26

## 2022-07-26 RX ORDER — ONDANSETRON 4 MG/1
4 TABLET, ORALLY DISINTEGRATING ORAL EVERY 4 HOURS PRN
Qty: 20 TABLET | Refills: 0 | Status: SHIPPED | OUTPATIENT
Start: 2022-07-26 | End: 2022-08-02

## 2022-07-26 RX ORDER — ONDANSETRON 4 MG/1
4 TABLET, ORALLY DISINTEGRATING ORAL ONCE
Status: DISCONTINUED | OUTPATIENT
Start: 2022-07-26 | End: 2022-07-26

## 2022-07-26 NOTE — ED INITIAL ASSESSMENT (HPI)
Patient had Reda Imperial yesterday at dinner- and states started to feel ill this morning  3 bouts of diarrhea and one bout of emesis.     Slight nausea  Needs work note as she left work today

## 2022-10-12 ENCOUNTER — OFFICE VISIT (OUTPATIENT)
Dept: FAMILY MEDICINE CLINIC | Facility: CLINIC | Age: 37
End: 2022-10-12
Payer: MEDICAID

## 2022-10-12 ENCOUNTER — LAB ENCOUNTER (OUTPATIENT)
Dept: LAB | Age: 37
End: 2022-10-12
Attending: FAMILY MEDICINE
Payer: MEDICAID

## 2022-10-12 VITALS
HEART RATE: 90 BPM | WEIGHT: 246 LBS | SYSTOLIC BLOOD PRESSURE: 124 MMHG | BODY MASS INDEX: 45.27 KG/M2 | HEIGHT: 62 IN | DIASTOLIC BLOOD PRESSURE: 81 MMHG

## 2022-10-12 DIAGNOSIS — L02.93 CARBUNCLE: ICD-10-CM

## 2022-10-12 DIAGNOSIS — Z00.00 WELL ADULT EXAM: Primary | ICD-10-CM

## 2022-10-12 DIAGNOSIS — F12.90 CANNABIS USE WITHOUT COMPLICATION: ICD-10-CM

## 2022-10-12 DIAGNOSIS — E66.01 MORBID OBESITY WITH BMI OF 40.0-44.9, ADULT (HCC): ICD-10-CM

## 2022-10-12 DIAGNOSIS — N39.41 URGENCY INCONTINENCE: ICD-10-CM

## 2022-10-12 DIAGNOSIS — F17.200 SMOKER: ICD-10-CM

## 2022-10-12 DIAGNOSIS — R35.1 NOCTURIA: ICD-10-CM

## 2022-10-12 LAB
ALBUMIN SERPL-MCNC: 2.9 G/DL (ref 3.4–5)
ALBUMIN/GLOB SERPL: 0.9 {RATIO} (ref 1–2)
ALP LIVER SERPL-CCNC: 97 U/L
ALT SERPL-CCNC: 16 U/L
ANION GAP SERPL CALC-SCNC: 4 MMOL/L (ref 0–18)
AST SERPL-CCNC: 10 U/L (ref 15–37)
BASOPHILS # BLD AUTO: 0.06 X10(3) UL (ref 0–0.2)
BASOPHILS NFR BLD AUTO: 1 %
BILIRUB SERPL-MCNC: 0.3 MG/DL (ref 0.1–2)
BILIRUB UR QL: NEGATIVE
BUN BLD-MCNC: 10 MG/DL (ref 7–18)
BUN/CREAT SERPL: 11.2 (ref 10–20)
CALCIUM BLD-MCNC: 8.9 MG/DL (ref 8.5–10.1)
CHLORIDE SERPL-SCNC: 106 MMOL/L (ref 98–112)
CLARITY UR: CLEAR
CO2 SERPL-SCNC: 30 MMOL/L (ref 21–32)
COLOR UR: YELLOW
CREAT BLD-MCNC: 0.89 MG/DL
DEPRECATED RDW RBC AUTO: 52.2 FL (ref 35.1–46.3)
EOSINOPHIL # BLD AUTO: 0.59 X10(3) UL (ref 0–0.7)
EOSINOPHIL NFR BLD AUTO: 10.1 %
ERYTHROCYTE [DISTWIDTH] IN BLOOD BY AUTOMATED COUNT: 14.7 % (ref 11–15)
EST. AVERAGE GLUCOSE BLD GHB EST-MCNC: 108 MG/DL (ref 68–126)
FASTING STATUS PATIENT QL REPORTED: YES
GFR SERPLBLD BASED ON 1.73 SQ M-ARVRAT: 85 ML/MIN/1.73M2 (ref 60–?)
GLOBULIN PLAS-MCNC: 3.3 G/DL (ref 2.8–4.4)
GLUCOSE BLD-MCNC: 88 MG/DL (ref 70–99)
GLUCOSE UR-MCNC: NEGATIVE MG/DL
HBA1C MFR BLD: 5.4 % (ref ?–5.7)
HCT VFR BLD AUTO: 43.2 %
HGB BLD-MCNC: 13.4 G/DL
HGB UR QL STRIP.AUTO: NEGATIVE
IMM GRANULOCYTES # BLD AUTO: 0.01 X10(3) UL (ref 0–1)
IMM GRANULOCYTES NFR BLD: 0.2 %
KETONES UR-MCNC: NEGATIVE MG/DL
LEUKOCYTE ESTERASE UR QL STRIP.AUTO: NEGATIVE
LYMPHOCYTES # BLD AUTO: 2.2 X10(3) UL (ref 1–4)
LYMPHOCYTES NFR BLD AUTO: 37.5 %
MCH RBC QN AUTO: 29.4 PG (ref 26–34)
MCHC RBC AUTO-ENTMCNC: 31 G/DL (ref 31–37)
MCV RBC AUTO: 94.7 FL
MONOCYTES # BLD AUTO: 0.6 X10(3) UL (ref 0.1–1)
MONOCYTES NFR BLD AUTO: 10.2 %
NEUTROPHILS # BLD AUTO: 2.4 X10 (3) UL (ref 1.5–7.7)
NEUTROPHILS # BLD AUTO: 2.4 X10(3) UL (ref 1.5–7.7)
NEUTROPHILS NFR BLD AUTO: 41 %
NITRITE UR QL STRIP.AUTO: NEGATIVE
OSMOLALITY SERPL CALC.SUM OF ELEC: 288 MOSM/KG (ref 275–295)
PH UR: 7 [PH] (ref 5–8)
PLATELET # BLD AUTO: 313 10(3)UL (ref 150–450)
POTASSIUM SERPL-SCNC: 4.2 MMOL/L (ref 3.5–5.1)
PROT SERPL-MCNC: 6.2 G/DL (ref 6.4–8.2)
PROT UR-MCNC: NEGATIVE MG/DL
RBC # BLD AUTO: 4.56 X10(6)UL
SODIUM SERPL-SCNC: 140 MMOL/L (ref 136–145)
SP GR UR STRIP: 1.02 (ref 1–1.03)
UROBILINOGEN UR STRIP-ACNC: 1
WBC # BLD AUTO: 5.9 X10(3) UL (ref 4–11)

## 2022-10-12 PROCEDURE — 85025 COMPLETE CBC W/AUTO DIFF WBC: CPT

## 2022-10-12 PROCEDURE — 3079F DIAST BP 80-89 MM HG: CPT | Performed by: FAMILY MEDICINE

## 2022-10-12 PROCEDURE — 36415 COLL VENOUS BLD VENIPUNCTURE: CPT | Performed by: FAMILY MEDICINE

## 2022-10-12 PROCEDURE — 99395 PREV VISIT EST AGE 18-39: CPT | Performed by: FAMILY MEDICINE

## 2022-10-12 PROCEDURE — 83036 HEMOGLOBIN GLYCOSYLATED A1C: CPT | Performed by: FAMILY MEDICINE

## 2022-10-12 PROCEDURE — 87086 URINE CULTURE/COLONY COUNT: CPT

## 2022-10-12 PROCEDURE — 81003 URINALYSIS AUTO W/O SCOPE: CPT

## 2022-10-12 PROCEDURE — 80053 COMPREHEN METABOLIC PANEL: CPT

## 2022-10-12 PROCEDURE — 3008F BODY MASS INDEX DOCD: CPT | Performed by: FAMILY MEDICINE

## 2022-10-12 PROCEDURE — 3074F SYST BP LT 130 MM HG: CPT | Performed by: FAMILY MEDICINE

## 2022-10-16 DIAGNOSIS — N39.41 URGENCY INCONTINENCE: Primary | ICD-10-CM

## 2022-10-16 DIAGNOSIS — R35.1 NOCTURIA: ICD-10-CM

## 2023-02-05 ENCOUNTER — HOSPITAL ENCOUNTER (OUTPATIENT)
Age: 38
Discharge: HOME | End: 2023-02-05
Payer: MEDICAID

## 2023-02-05 VITALS
OXYGEN SATURATION: 98 % | RESPIRATION RATE: 19 BRPM | SYSTOLIC BLOOD PRESSURE: 130 MMHG | DIASTOLIC BLOOD PRESSURE: 91 MMHG | HEART RATE: 100 BPM | TEMPERATURE: 98 F

## 2023-02-05 DIAGNOSIS — U07.1 COVID: Primary | ICD-10-CM

## 2023-02-05 DIAGNOSIS — R52 BODY ACHES: ICD-10-CM

## 2023-02-05 LAB
S PYO AG THROAT QL: NEGATIVE
SARS-COV-2 RNA RESP QL NAA+PROBE: DETECTED

## 2023-02-05 PROCEDURE — 87880 STREP A ASSAY W/OPTIC: CPT | Performed by: NURSE PRACTITIONER

## 2023-02-05 PROCEDURE — 99213 OFFICE O/P EST LOW 20 MIN: CPT | Performed by: NURSE PRACTITIONER

## 2023-02-05 PROCEDURE — U0002 COVID-19 LAB TEST NON-CDC: HCPCS | Performed by: NURSE PRACTITIONER

## 2023-02-05 NOTE — DISCHARGE INSTRUCTIONS
Per the CDC recommendations persons with COVID-19 who have symptoms and were directed to the care for themselves at home may discontinue isolation of the following conditions. Stay home for 5 days. If you have no symptoms or your symptoms are resolving after 5 days you can leave your house. Continue to wear a mask around others for 5 additional days. If you have a fever continue stay at home until your fever resolves. If you are diagnosed with Covid, refrain from exercise until approved by your primary care physician. Please call your primary care provider within 2 to 3 days of your discharge to arrange a telehealth follow-up. CDC does not recommend repeat testing after positive test.  Take Tylenol and ibuprofen as needed for fever, body aches and chills.   Over-the-counter multivitamins, take melatonin at night

## 2023-07-11 ENCOUNTER — APPOINTMENT (OUTPATIENT)
Dept: GENERAL RADIOLOGY | Age: 38
End: 2023-07-11
Attending: PHYSICIAN ASSISTANT
Payer: MEDICAID

## 2023-07-11 ENCOUNTER — HOSPITAL ENCOUNTER (OUTPATIENT)
Age: 38
Discharge: HOME | End: 2023-07-11
Payer: MEDICAID

## 2023-07-11 VITALS
HEIGHT: 61 IN | DIASTOLIC BLOOD PRESSURE: 74 MMHG | SYSTOLIC BLOOD PRESSURE: 124 MMHG | HEART RATE: 68 BPM | BODY MASS INDEX: 46.26 KG/M2 | TEMPERATURE: 98 F | OXYGEN SATURATION: 98 % | RESPIRATION RATE: 18 BRPM | WEIGHT: 245 LBS

## 2023-07-11 DIAGNOSIS — M19.032 OSTEOARTHRITIS OF LEFT WRIST, UNSPECIFIED OSTEOARTHRITIS TYPE: ICD-10-CM

## 2023-07-11 DIAGNOSIS — M25.532 WRIST PAIN, ACUTE, LEFT: Primary | ICD-10-CM

## 2023-07-11 PROCEDURE — 99213 OFFICE O/P EST LOW 20 MIN: CPT | Performed by: PHYSICIAN ASSISTANT

## 2023-07-11 PROCEDURE — 73110 X-RAY EXAM OF WRIST: CPT | Performed by: PHYSICIAN ASSISTANT

## 2023-07-11 PROCEDURE — L3924 HFO WITHOUT JOINTS PRE OTS: HCPCS | Performed by: PHYSICIAN ASSISTANT

## 2023-07-11 RX ORDER — DEXAMETHASONE 4 MG/1
12 TABLET ORAL ONCE
Status: COMPLETED | OUTPATIENT
Start: 2023-07-11 | End: 2023-07-11

## 2023-07-11 RX ORDER — METHYLPREDNISOLONE 4 MG/1
TABLET ORAL
Qty: 1 EACH | Refills: 0 | Status: SHIPPED | OUTPATIENT
Start: 2023-07-11

## 2023-07-11 RX ORDER — IBUPROFEN 600 MG/1
600 TABLET ORAL ONCE
Status: COMPLETED | OUTPATIENT
Start: 2023-07-11 | End: 2023-07-11

## 2023-07-11 RX ORDER — NAPROXEN 500 MG/1
500 TABLET ORAL 2 TIMES DAILY PRN
Qty: 20 TABLET | Refills: 0 | Status: SHIPPED | OUTPATIENT
Start: 2023-07-11

## 2023-07-11 NOTE — ED INITIAL ASSESSMENT (HPI)
Pt started a new job 2 weeks ago , and for the past 4 days she has had left forearm pain.   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

## 2023-07-11 NOTE — DISCHARGE INSTRUCTIONS
Please return to the ER/clinic if symptoms worsen. Follow-up with your PCP in 24-48 hours as needed. Wear the Velcro wrist splint as provided. Recommend naproxen twice daily with food. We will also send in a Medrol Dosepak to start on day 2 or 3 if symptoms persist.  Make a follow-up appointment with orthopedics for further evaluation and treatment.

## 2024-04-08 ENCOUNTER — OFFICE VISIT (OUTPATIENT)
Dept: FAMILY MEDICINE CLINIC | Facility: CLINIC | Age: 39
End: 2024-04-08
Payer: MEDICAID

## 2024-04-08 ENCOUNTER — LAB ENCOUNTER (OUTPATIENT)
Dept: LAB | Age: 39
End: 2024-04-08
Attending: FAMILY MEDICINE
Payer: MEDICAID

## 2024-04-08 VITALS
BODY MASS INDEX: 48.71 KG/M2 | SYSTOLIC BLOOD PRESSURE: 128 MMHG | HEIGHT: 61 IN | DIASTOLIC BLOOD PRESSURE: 82 MMHG | HEART RATE: 98 BPM | WEIGHT: 258 LBS

## 2024-04-08 DIAGNOSIS — Z00.00 WELL ADULT EXAM: ICD-10-CM

## 2024-04-08 DIAGNOSIS — Z11.3 SCREENING EXAMINATION FOR STD (SEXUALLY TRANSMITTED DISEASE): ICD-10-CM

## 2024-04-08 DIAGNOSIS — E55.9 VITAMIN D DEFICIENCY: ICD-10-CM

## 2024-04-08 DIAGNOSIS — R29.898 BILATERAL LEG WEAKNESS: ICD-10-CM

## 2024-04-08 DIAGNOSIS — E66.01 MORBID OBESITY WITH BMI OF 40.0-44.9, ADULT (HCC): ICD-10-CM

## 2024-04-08 DIAGNOSIS — F17.200 SMOKER: ICD-10-CM

## 2024-04-08 DIAGNOSIS — F12.90 CANNABIS USE WITHOUT COMPLICATION: ICD-10-CM

## 2024-04-08 DIAGNOSIS — Z01.419 ENCOUNTER FOR GYNECOLOGICAL EXAMINATION: ICD-10-CM

## 2024-04-08 DIAGNOSIS — Z00.00 WELL ADULT EXAM: Primary | ICD-10-CM

## 2024-04-08 LAB
ALBUMIN SERPL-MCNC: 4.3 G/DL (ref 3.2–4.8)
ALBUMIN/GLOB SERPL: 1.5 {RATIO} (ref 1–2)
ALP LIVER SERPL-CCNC: 107 U/L
ALT SERPL-CCNC: <7 U/L
ANION GAP SERPL CALC-SCNC: 2 MMOL/L (ref 0–18)
AST SERPL-CCNC: 10 U/L (ref ?–34)
BASOPHILS # BLD AUTO: 0.04 X10(3) UL (ref 0–0.2)
BASOPHILS NFR BLD AUTO: 0.6 %
BILIRUB SERPL-MCNC: 0.3 MG/DL (ref 0.3–1.2)
BUN BLD-MCNC: 13 MG/DL (ref 9–23)
BUN/CREAT SERPL: 14.6 (ref 10–20)
CALCIUM BLD-MCNC: 9.5 MG/DL (ref 8.7–10.4)
CHLORIDE SERPL-SCNC: 110 MMOL/L (ref 98–112)
CHOLEST SERPL-MCNC: 125 MG/DL (ref ?–200)
CO2 SERPL-SCNC: 27 MMOL/L (ref 21–32)
CREAT BLD-MCNC: 0.89 MG/DL
DEPRECATED RDW RBC AUTO: 52.2 FL (ref 35.1–46.3)
EGFRCR SERPLBLD CKD-EPI 2021: 84 ML/MIN/1.73M2 (ref 60–?)
EOSINOPHIL # BLD AUTO: 0.28 X10(3) UL (ref 0–0.7)
EOSINOPHIL NFR BLD AUTO: 4 %
ERYTHROCYTE [DISTWIDTH] IN BLOOD BY AUTOMATED COUNT: 15.8 % (ref 11–15)
EST. AVERAGE GLUCOSE BLD GHB EST-MCNC: 108 MG/DL (ref 68–126)
FASTING PATIENT LIPID ANSWER: NO
FASTING STATUS PATIENT QL REPORTED: NO
GLOBULIN PLAS-MCNC: 2.8 G/DL (ref 2.8–4.4)
GLUCOSE BLD-MCNC: 87 MG/DL (ref 70–99)
HBA1C MFR BLD: 5.4 % (ref ?–5.7)
HCT VFR BLD AUTO: 37.5 %
HDLC SERPL-MCNC: 42 MG/DL (ref 40–59)
HGB BLD-MCNC: 11.7 G/DL
IMM GRANULOCYTES # BLD AUTO: 0.02 X10(3) UL (ref 0–1)
IMM GRANULOCYTES NFR BLD: 0.3 %
LDLC SERPL CALC-MCNC: 70 MG/DL (ref ?–100)
LYMPHOCYTES # BLD AUTO: 2.51 X10(3) UL (ref 1–4)
LYMPHOCYTES NFR BLD AUTO: 35.9 %
MCH RBC QN AUTO: 28.1 PG (ref 26–34)
MCHC RBC AUTO-ENTMCNC: 31.2 G/DL (ref 31–37)
MCV RBC AUTO: 90.1 FL
MONOCYTES # BLD AUTO: 0.52 X10(3) UL (ref 0.1–1)
MONOCYTES NFR BLD AUTO: 7.4 %
NEUTROPHILS # BLD AUTO: 3.62 X10 (3) UL (ref 1.5–7.7)
NEUTROPHILS # BLD AUTO: 3.62 X10(3) UL (ref 1.5–7.7)
NEUTROPHILS NFR BLD AUTO: 51.8 %
NONHDLC SERPL-MCNC: 83 MG/DL (ref ?–130)
OSMOLALITY SERPL CALC.SUM OF ELEC: 287 MOSM/KG (ref 275–295)
PLATELET # BLD AUTO: 298 10(3)UL (ref 150–450)
POTASSIUM SERPL-SCNC: 4.2 MMOL/L (ref 3.5–5.1)
PROT SERPL-MCNC: 7.1 G/DL (ref 5.7–8.2)
RBC # BLD AUTO: 4.16 X10(6)UL
SODIUM SERPL-SCNC: 139 MMOL/L (ref 136–145)
TRIGL SERPL-MCNC: 63 MG/DL (ref 30–149)
TSI SER-ACNC: 3.22 MIU/ML (ref 0.55–4.78)
VIT B12 SERPL-MCNC: 449 PG/ML (ref 211–911)
VIT D+METAB SERPL-MCNC: 8.6 NG/ML (ref 30–100)
VLDLC SERPL CALC-MCNC: 10 MG/DL (ref 0–30)
WBC # BLD AUTO: 7 X10(3) UL (ref 4–11)

## 2024-04-08 PROCEDURE — 85025 COMPLETE CBC W/AUTO DIFF WBC: CPT

## 2024-04-08 PROCEDURE — 80053 COMPREHEN METABOLIC PANEL: CPT

## 2024-04-08 PROCEDURE — 82607 VITAMIN B-12: CPT

## 2024-04-08 PROCEDURE — 83036 HEMOGLOBIN GLYCOSYLATED A1C: CPT

## 2024-04-08 PROCEDURE — 80061 LIPID PANEL: CPT

## 2024-04-08 PROCEDURE — 84443 ASSAY THYROID STIM HORMONE: CPT

## 2024-04-08 PROCEDURE — 36415 COLL VENOUS BLD VENIPUNCTURE: CPT

## 2024-04-08 PROCEDURE — 82306 VITAMIN D 25 HYDROXY: CPT

## 2024-04-08 NOTE — PROGRESS NOTES
HPI:    Patient ID: Christine Alcocer is a 39 year old female.    HPI  Chief Complaint   Patient presents with    Routine Physical    Weakness     On legs     Lump     On R breast states she noticed it last month h gets them on and off due to large breast no pain no discharge     Pap       Wt Readings from Last 6 Encounters:   04/08/24 258 lb (117 kg)   07/11/23 245 lb (111.1 kg)   10/12/22 246 lb (111.6 kg)   07/26/22 235 lb (106.6 kg)   04/05/22 247 lb (112 kg)   04/01/22 240 lb (108.9 kg)     BP Readings from Last 3 Encounters:   04/08/24 128/82   07/11/23 124/74   02/05/23 (!) 130/91       4 kids  Youngest is 3 rs and recently diagnosed with autism spectrum disorder    Review of Systems   Constitutional:  Negative for activity change, appetite change, chills, fatigue, fever and unexpected weight change.   HENT:  Negative for congestion, dental problem, drooling, ear discharge, ear pain, facial swelling, hearing loss, mouth sores, nosebleeds, postnasal drip, rhinorrhea, sinus pressure, sinus pain, sneezing, sore throat, tinnitus, trouble swallowing and voice change.    Eyes:  Negative for pain, discharge, redness and visual disturbance.   Respiratory:  Negative for cough, shortness of breath and wheezing.    Cardiovascular:  Negative for chest pain, palpitations and leg swelling.   Gastrointestinal:  Negative for abdominal pain, anal bleeding, blood in stool, constipation, diarrhea, nausea, rectal pain and vomiting.   Endocrine: Negative for cold intolerance, heat intolerance, polydipsia, polyphagia and polyuria.   Genitourinary:  Negative for decreased urine volume, difficulty urinating, dysuria, flank pain, frequency, menstrual problem, pelvic pain, urgency, vaginal bleeding, vaginal discharge and vaginal pain.        Periods are regular     Musculoskeletal:  Negative for arthralgias, back pain and myalgias.   Skin:  Negative for rash.   Neurological:  Negative for dizziness, seizures, syncope,  weakness, numbness and headaches.   Hematological:  Does not bruise/bleed easily.   Psychiatric/Behavioral:  Negative for behavioral problems, decreased concentration, self-injury, sleep disturbance and suicidal ideas. The patient is not nervous/anxious.        /82   Pulse 98   Ht 5' 1\" (1.549 m)   Wt 258 lb (117 kg)   LMP 2023 (Approximate)   BMI 48.75 kg/m²     Past Medical History:   Diagnosis Date    Esophageal reflux     History of trichomoniasis     Marijuana use     Obesity, Class III, BMI 40-49.9 (morbid obesity) ([facility])     Smoker      Past Surgical History:   Procedure Laterality Date    CHOLECYSTECTOMY          D & C      x3          3    OOPHORECTOMY Right     OTHER SURGICAL HISTORY      2013 RT ovary removed after      Social History     Socioeconomic History    Marital status: Single     Spouse name: Not on file    Number of children: Not on file    Years of education: Not on file    Highest education level: Not on file   Occupational History    Not on file   Tobacco Use    Smoking status: Every Day     Packs/day: 0.25     Years: 13.00     Additional pack years: 0.00     Total pack years: 3.25     Types: Cigarettes    Smokeless tobacco: Never    Tobacco comments:     3-4 cigarettes a day    Vaping Use    Vaping Use: Never used   Substance and Sexual Activity    Alcohol use: Not Currently     Comment: Occas.    Drug use: Not Currently     Frequency: 7.0 times per week     Types: Cannabis     Comment: occassionally per pt     Sexual activity: Not on file   Other Topics Concern    Not on file   Social History Narrative    Not on file     Social Determinants of Health     Financial Resource Strain: Not on file   Food Insecurity: Not on file   Transportation Needs: Not on file   Physical Activity: Not on file   Stress: Not on file   Social Connections: Not on file   Housing Stability: Not on file     Family History   Problem Relation Age of Onset    Diabetes Paternal  Grandmother     Hypertension Paternal Grandmother     Diabetes Other     Hypertension Maternal Grandmother     Cancer Neg        Immunization History   Administered Date(s) Administered    FLULAVAL 6 months & older 0.5 ml Prefilled syringe (21845) 11/16/2020    FLUZONE 6 months and older PFS 0.5 ml (13475) 11/16/2020    RHO(D) Immune Globulin 01/28/2021, 04/20/2021    TD 06/08/2007    TDAP 04/20/2021       Health Maintenance   Topic Date Due    Mammogram  Never done    Pap Smear  06/29/2023    COVID-19 Vaccine (1 - 2023-24 season) Never done    Tobacco Cessation Counseling 1 Year  10/12/2023    Annual Physical  10/12/2023    Annual Depression Screening  01/01/2024    Influenza Vaccine (Season Ended) 10/01/2024    DTaP,Tdap,and Td Vaccines (2 - Td or Tdap) 04/20/2031    Pneumococcal Vaccine: Birth to 64yrs  Aged Out          No current outpatient medications on file.     Allergies:No Known Allergies   PHYSICAL EXAM:     Chief Complaint   Patient presents with    Routine Physical    Weakness     On legs     Lump     On R breast states she noticed it last month h gets them on and off due to large breast no pain no discharge     Pap      Physical Exam  Vitals and nursing note reviewed.   Constitutional:       Appearance: She is well-developed.   HENT:      Head: Normocephalic and atraumatic.      Right Ear: External ear normal.      Left Ear: External ear normal.      Nose: Nose normal.      Mouth/Throat:      Pharynx: No oropharyngeal exudate.   Eyes:      General:         Right eye: No discharge.         Left eye: No discharge.      Conjunctiva/sclera: Conjunctivae normal.      Pupils: Pupils are equal, round, and reactive to light.   Neck:      Thyroid: No thyromegaly.   Cardiovascular:      Rate and Rhythm: Normal rate and regular rhythm.      Heart sounds: Normal heart sounds. No murmur heard.  Pulmonary:      Effort: Pulmonary effort is normal.      Breath sounds: Normal breath sounds. No wheezing.   Chest:    Breasts:     Breasts are symmetrical.      Right: Normal.      Left: Normal.      Comments: Small Superficial skin mass, unchanged for years, non tender   Abdominal:      General: Bowel sounds are normal.      Palpations: Abdomen is soft. There is no mass.      Tenderness: There is no abdominal tenderness.   Genitourinary:     Labia:         Right: No rash, tenderness, lesion or injury.         Left: No rash, tenderness, lesion or injury.       Vagina: Normal. No vaginal discharge.      Cervix: No cervical motion tenderness, discharge or friability.      Adnexa:         Right: No mass, tenderness or fullness.          Left: No mass, tenderness or fullness.        Comments: Pap done  Musculoskeletal:         General: No tenderness.      Cervical back: Normal range of motion and neck supple.   Lymphadenopathy:      Cervical: No cervical adenopathy.      Upper Body:      Right upper body: No supraclavicular or axillary adenopathy.      Left upper body: No supraclavicular or axillary adenopathy.   Skin:     General: Skin is dry.      Findings: No rash.   Neurological:      Mental Status: She is alert and oriented to person, place, and time.      Cranial Nerves: No cranial nerve deficit.      Motor: No abnormal muscle tone.      Coordination: Coordination normal.      Deep Tendon Reflexes: Reflexes are normal and symmetric. Reflexes normal.   Psychiatric:         Behavior: Behavior normal.         Thought Content: Thought content normal.         Judgment: Judgment normal.                ASSESSMENT/PLAN:     Return yearly for physicals  Follow up with dentist every 6 months  Follow up with eye doctor yearly  Recommend aerobic exercise for at least 30mins 5 days a week  Yearly flu shot  Tetanus booster every 10 years (Tdap/ Td)  Labs ordered/ or reviewed if done prior to appointment     Encounter Diagnoses   Name Primary?    Well adult exam Yes    Morbid obesity with BMI of 40.0-44.9, adult (HCC)     Vitamin D deficiency      Cannabis use without complication     Smoker     Encounter for gynecological examination     Screening examination for STD (sexually transmitted disease)     Bilateral leg weakness        1. Well adult exam    - CBC With Differential With Platelet; Future  - Comp Metabolic Panel (14); Future  - Lipid Panel; Future  - TSH W Reflex To Free T4; Future  - Hemoglobin A1C; Future    2. Morbid obesity with BMI of 40.0-44.9, adult ([facility])  Wt Readings from Last 6 Encounters:   04/08/24 258 lb (117 kg)   07/11/23 245 lb (111.1 kg)   10/12/22 246 lb (111.6 kg)   07/26/22 235 lb (106.6 kg)   04/05/22 247 lb (112 kg)   04/01/22 240 lb (108.9 kg)       Highly recommend to lose weight.  Discussed good dietary and eating habits as well as increasing vegetable and fruit intake.  Recommending avoiding foods high in fat content.  Recommend exercising at least 30-40 minutes 5-6 days a week.  Avoid skipping meals.  Making healthy choices for snacks and also limiting sugary beverages.  Seeing weight management soon     3. Vitamin D deficiency    - Vitamin D [E]; Future    4. Cannabis use without complication  Encouraged cessation  Discussed long term effects of continuous canabis use     5. Smoker  Advised smoking cessation   Patient hesitant   - XR CHEST PA + LAT CHEST (CPT=71046); Future    6. Encounter for gynecological examination  Pap, pelvic and breast exam done  - ThinPrep PAP with HPV Reflex Request B; Future    7. Screening examination for STD (sexually transmitted disease)    - Chlamydia/Gc Amplification; Future    8. Bilateral weakness  Check labs , b12, d, A1c      Orders Placed This Encounter   Procedures    CBC With Differential With Platelet    Comp Metabolic Panel (14)    Lipid Panel    TSH W Reflex To Free T4    Hemoglobin A1C    Vitamin D [E]    Vitamin B12    ThinPrep PAP with HPV Reflex Request B    Chlamydia/Gc Amplification       The above note was creating using Dragon speech recognition technology. Please  excuse any typos    Meds This Visit:  Requested Prescriptions      No prescriptions requested or ordered in this encounter       Imaging & Referrals:  XR CHEST PA + LAT CHEST (CPT=71046)       ID#[id number]

## 2024-04-09 LAB
C TRACH DNA SPEC QL NAA+PROBE: NEGATIVE
N GONORRHOEA DNA SPEC QL NAA+PROBE: NEGATIVE

## 2024-04-14 DIAGNOSIS — E55.9 VITAMIN D DEFICIENCY: Primary | ICD-10-CM

## 2024-04-14 DIAGNOSIS — D64.9 MILD ANEMIA: ICD-10-CM

## 2024-04-14 RX ORDER — ERGOCALCIFEROL 1.25 MG/1
50000 CAPSULE ORAL WEEKLY
Qty: 12 CAPSULE | Refills: 3 | Status: SHIPPED | OUTPATIENT
Start: 2024-04-14 | End: 2025-03-16

## 2024-04-15 ENCOUNTER — PATIENT MESSAGE (OUTPATIENT)
Dept: FAMILY MEDICINE CLINIC | Facility: CLINIC | Age: 39
End: 2024-04-15

## 2024-04-15 DIAGNOSIS — E55.9 VITAMIN D DEFICIENCY: ICD-10-CM

## 2024-04-15 NOTE — TELEPHONE ENCOUNTER
From: Christine Alcocer  To: Kenney Blood  Sent: 4/15/2024 9:38 AM CDT  Subject: Blood results     Good morning I do get heavy periods but you want to schedule an appointment for with this department for what? I’m confused is there something I need to be concerned about?

## 2024-04-16 RX ORDER — ERGOCALCIFEROL 1.25 MG/1
50000 CAPSULE ORAL WEEKLY
Qty: 12 CAPSULE | Refills: 3 | OUTPATIENT
Start: 2024-04-16 | End: 2025-03-18

## 2024-04-25 ENCOUNTER — EKG ENCOUNTER (OUTPATIENT)
Dept: LAB | Facility: HOSPITAL | Age: 39
End: 2024-04-25
Attending: INTERNAL MEDICINE
Payer: MEDICAID

## 2024-04-25 ENCOUNTER — OFFICE VISIT (OUTPATIENT)
Dept: SURGERY | Facility: CLINIC | Age: 39
End: 2024-04-25
Payer: MEDICAID

## 2024-04-25 VITALS
DIASTOLIC BLOOD PRESSURE: 70 MMHG | OXYGEN SATURATION: 94 % | WEIGHT: 256.31 LBS | BODY MASS INDEX: 45.99 KG/M2 | HEIGHT: 62.7 IN | HEART RATE: 84 BPM | SYSTOLIC BLOOD PRESSURE: 124 MMHG

## 2024-04-25 DIAGNOSIS — E66.01 MORBID OBESITY WITH BMI OF 45.0-49.9, ADULT (HCC): ICD-10-CM

## 2024-04-25 DIAGNOSIS — D50.9 IRON DEFICIENCY ANEMIA, UNSPECIFIED IRON DEFICIENCY ANEMIA TYPE: ICD-10-CM

## 2024-04-25 DIAGNOSIS — R12 HEART BURN: ICD-10-CM

## 2024-04-25 DIAGNOSIS — F17.200 TOBACCO DEPENDENCE: ICD-10-CM

## 2024-04-25 DIAGNOSIS — E55.9 VITAMIN D DEFICIENCY: ICD-10-CM

## 2024-04-25 DIAGNOSIS — R12 HEART BURN: Primary | ICD-10-CM

## 2024-04-25 DIAGNOSIS — Z51.81 ENCOUNTER FOR THERAPEUTIC DRUG MONITORING: ICD-10-CM

## 2024-04-25 LAB
ATRIAL RATE: 62 BPM
P AXIS: 47 DEGREES
P-R INTERVAL: 146 MS
Q-T INTERVAL: 406 MS
QRS DURATION: 98 MS
QTC CALCULATION (BEZET): 412 MS
R AXIS: 26 DEGREES
T AXIS: 26 DEGREES
VENTRICULAR RATE: 62 BPM

## 2024-04-25 PROCEDURE — 99205 OFFICE O/P NEW HI 60 MIN: CPT | Performed by: INTERNAL MEDICINE

## 2024-04-25 PROCEDURE — 93005 ELECTROCARDIOGRAM TRACING: CPT

## 2024-04-25 PROCEDURE — 93010 ELECTROCARDIOGRAM REPORT: CPT | Performed by: INTERNAL MEDICINE

## 2024-04-25 RX ORDER — PHENTERMINE HYDROCHLORIDE 15 MG/1
15 CAPSULE ORAL EVERY MORNING
Qty: 30 CAPSULE | Refills: 5 | Status: SHIPPED | OUTPATIENT
Start: 2024-04-25

## 2024-04-25 RX ORDER — PANTOPRAZOLE SODIUM 20 MG/1
20 TABLET, DELAYED RELEASE ORAL
Qty: 30 TABLET | Refills: 5 | Status: SHIPPED | OUTPATIENT
Start: 2024-04-25

## 2024-04-25 NOTE — PROGRESS NOTES
[facility] Consultation Note       Patient:  Christine Alcocer  :      1985  MRN:      [id number]    Referring Provider: Dr. Blood       Chief Complaint:    Chief Complaint   Patient presents with    Consult      referral.Patient stated she is having back pain from breast     Weight Management       SUBJECTIVE     History of Present Illness:  Christine Alcocer has been referred to me for evaluation and treatment.       40 yo who lives with kids  She does the cooking  Not working  Currently at heaviest weight    Patient has tried several diets in the past including exercises and is frustrated with increase of weight. Weight has been a struggle for the past several years and is now starting to develop into co-morbidities that are worrisome to the patient. Patient is interested in losing weight, so it can stay off long term.    Patient also understands that this is a life style change and wants to get on track.    Interested in surgical weight loss    Past Medical History:   Past Medical History:    Esophageal reflux    History of trichomoniasis    Marijuana use    Obesity, Class III, BMI 40-49.9 (morbid obesity) (HCC)    Smoker       OBJECTIVE     Vitals: /70   Pulse 84   Ht 5' 2.7\" (1.593 m)   Wt 256 lb 4.8 oz (116.3 kg)   LMP 2023 (Approximate)   SpO2 94%   BMI 45.84 kg/m²      Patient Medications:    Current Outpatient Medications   Medication Sig Dispense Refill    pantoprazole 20 MG Oral Tab EC Take 1 tablet (20 mg total) by mouth every morning before breakfast. 30 tablet 5    Phentermine HCl 15 MG Oral Cap Take 1 capsule (15 mg total) by mouth every morning. 30 capsule 5    ergocalciferol 1.25 MG (47878 UT) Oral Cap Take 1 capsule (50,000 Units total) by mouth once a week. 12 capsule 3       Allergies:  Patient has no known allergies.     Comorbidities:      Social History:    Social History     Socioeconomic History    Marital status: Single     Spouse  name: Not on file    Number of children: Not on file    Years of education: Not on file    Highest education level: Not on file   Occupational History    Not on file   Tobacco Use    Smoking status: Every Day     Current packs/day: 0.25     Average packs/day: 0.3 packs/day for 13.0 years (3.3 ttl pk-yrs)     Types: Cigarettes    Smokeless tobacco: Never    Tobacco comments:     3-4 cigarettes a day    Vaping Use    Vaping status: Never Used   Substance and Sexual Activity    Alcohol use: Not Currently     Comment: Occas.    Drug use: Not Currently     Frequency: 7.0 times per week     Types: Cannabis     Comment: occassionally per pt     Sexual activity: Not on file   Other Topics Concern    Not on file   Social History Narrative    Not on file     Social Determinants of Health     Financial Resource Strain: Not on file   Food Insecurity: Not on file   Transportation Needs: Not on file   Physical Activity: Not on file   Stress: Not on file   Social Connections: Not on file   Housing Stability: At Risk (2023)    Received from [facility]     Living Situation: Not on file     Housing Problems: Not on file     Surgical History:    Past Surgical History:   Procedure Laterality Date    Cholecystectomy      2002    D & c      x3          3    Oophorectomy Right     Other surgical history      2013 RT ovary removed after        Family History:    Family History   Problem Relation Age of Onset    Diabetes Paternal Grandmother     Hypertension Paternal Grandmother     Diabetes Other     Hypertension Maternal Grandmother     Cancer Neg            Typical Dietary Intake:  Breakfast AM Snack Lunch PM Snack Dinner   Coffee with cream and sugar Cookies, cakes Chicken, greens, macaroni  Cakes, soda Chicken, veggies, pasta, corn   Eats quickly  portions  Soda Drinker?: Yes  If yes, how much?:  regular gingerale x3    Number of restaurant or fast food meals/week:  4 meals/week    Nutritional Goals  Reviewed and Discussed:     Limit carbohydrates to 100 gms per day, Eat 100-200 calories within 1 hour of waking up, and Eat 3-4 cups of fresh fruit or vegetables daily    Behavior Modifications Reviewed and Discussed:    Eat breakfast, Eat 3 meals per day, Plan meals in advance, Read nutrition labels, Drink 64oz of water per day, Maintain a daily food journal, No drinking 30 minutes before or after meals, Utilize portion control strategies to reduce calorie intake, Identify triggers for eating and manage cues, and Eat slowly and take 20 to 30 minutes to complete each meal      ROS:  Constitutional: positive for weight loss  Respiratory: negative  Cardiovascular: negative  Gastrointestinal: positive for reflux symptoms  Musculoskeletal:positive for arthralgias and back pain  Neurological: negative  Behavioral/Psych: positive for tobacco use and stress  Endocrine: negative  All other systems were reviewed and are negative.    Physical Exam:  General appearance: alert, appears stated age, cooperative, and morbidly obese  Head: Normocephalic, without obvious abnormality, atraumatic  Back: symmetric, no curvature. ROM normal. No CVA tenderness.  Lungs: clear to auscultation bilaterally  Heart: S1, S2 normal, no murmur, click, rub or gallop, regular rate and rhythm  Abdomen:  soft, obese, non tender  Extremities: extremities normal, atraumatic, no cyanosis or edema  Pulses: 2+ and symmetric  Skin:  irritation under skin folds  Neurologic: Grossly normal    ASSESSMENT         Encounter Diagnosis(ses):   1. Heart burn    2. Iron deficiency anemia, unspecified iron deficiency anemia type    3. Tobacco dependence    4. Vitamin D deficiency    5. Encounter for therapeutic drug monitoring    6. Morbid obesity with BMI of 45.0-49.9, adult (HCC)        PLAN     Discussed with patient the risks and benefits of RYGBP and VSG. The patient is interested in bariatric surgery and will begin the  presurgical process.     Aware of  JD    Heart burn: will restart PPI      Goals for next month:  1. Keep a food log.  2. Drink 48-64 ounces of non-caloric beverages per day. No fruit juices or regular soda.  3. Increase activity-upper body exercises, walk 10 minutes per day.  4. Increase fruit and vegetable servings to 5-6 per day.      Should attend seminar with Dr Velez  Referral given    PHENTERMINE: Since the patient would like to try phentermine, and is aware of the potential side effects (hypertension, palpitations, tachycardia, and anxiety), I will give Christine Alcocer a prescription today to be used in conjunction with the above diet and exercise program. The patient will check her heart rate and blood pressure on a regular basis. She will call me if her BP goes over 140/90 or if she has palpitations or racing heart rate. She understands that I will not call in the prescription for her; she has to have an appointment to have the medication refilled.   Will start at 15 mg   Needs EKG    Start PPI    Continue iron supplements    TOBACCO USE: The patient was instructed that she needs to stop smoking prior to bariatric surgery. She understands that smoking cessation needs to be lifelong after the procedure because of the risk of ulcers, bleeding, and strictures in the post-surgical pouch. The patient was encouraged to speak to her PCP about smoking cessation methods.       Follow up for visit #2/6    May benefit from breast lift.   May help with back pain    Diagnoses and all orders for this visit:    Heart burn  -     pantoprazole 20 MG Oral Tab EC; Take 1 tablet (20 mg total) by mouth every morning before breakfast.    Iron deficiency anemia, unspecified iron deficiency anemia type    Tobacco dependence    Vitamin D deficiency    Encounter for therapeutic drug monitoring    Morbid obesity with BMI of 45.0-49.9, adult (HCC)  -     SURGERY - EXTERNAL  -     Phentermine HCl 15 MG Oral Cap; Take 1 capsule (15 mg total) by mouth every  morning.        Jovani Farr MD

## 2024-04-29 LAB
HPV I/H RISK 1 DNA SPEC QL NAA+PROBE: NEGATIVE
LAST PAP RESULT: NORMAL
PAP HISTORY (OTHER THAN LAST PAP): NORMAL

## 2024-04-29 RX ORDER — METRONIDAZOLE 7.5 MG/G
1 GEL VAGINAL NIGHTLY
Qty: 1 EACH | Refills: 0 | Status: SHIPPED | OUTPATIENT
Start: 2024-04-29 | End: 2024-05-04

## 2024-04-29 RX ORDER — FLUCONAZOLE 150 MG/1
150 TABLET ORAL ONCE
Qty: 1 TABLET | Refills: 0 | Status: SHIPPED | OUTPATIENT
Start: 2024-04-29 | End: 2024-04-29

## 2024-05-20 ENCOUNTER — TELEPHONE (OUTPATIENT)
Dept: SURGERY | Facility: CLINIC | Age: 39
End: 2024-05-20

## 2024-12-18 ENCOUNTER — HOSPITAL ENCOUNTER (OUTPATIENT)
Age: 39
Discharge: HOME | End: 2024-12-18
Payer: MEDICAID

## 2024-12-18 ENCOUNTER — APPOINTMENT (OUTPATIENT)
Dept: GENERAL RADIOLOGY | Age: 39
End: 2024-12-18
Attending: PHYSICIAN ASSISTANT
Payer: MEDICAID

## 2024-12-18 VITALS
HEIGHT: 61 IN | RESPIRATION RATE: 22 BRPM | WEIGHT: 250 LBS | TEMPERATURE: 100 F | HEART RATE: 86 BPM | BODY MASS INDEX: 47.2 KG/M2 | SYSTOLIC BLOOD PRESSURE: 146 MMHG | DIASTOLIC BLOOD PRESSURE: 89 MMHG | OXYGEN SATURATION: 98 %

## 2024-12-18 DIAGNOSIS — R05.3 PERSISTENT COUGH: Primary | ICD-10-CM

## 2024-12-18 DIAGNOSIS — J10.1 INFLUENZA A: ICD-10-CM

## 2024-12-18 LAB
POCT INFLUENZA A: POSITIVE
POCT INFLUENZA B: NEGATIVE
S PYO AG THROAT QL: NEGATIVE
SARS-COV-2 RNA RESP QL NAA+PROBE: NOT DETECTED

## 2024-12-18 PROCEDURE — 71046 X-RAY EXAM CHEST 2 VIEWS: CPT | Performed by: PHYSICIAN ASSISTANT

## 2024-12-18 PROCEDURE — 99213 OFFICE O/P EST LOW 20 MIN: CPT | Performed by: PHYSICIAN ASSISTANT

## 2024-12-18 PROCEDURE — U0002 COVID-19 LAB TEST NON-CDC: HCPCS | Performed by: PHYSICIAN ASSISTANT

## 2024-12-18 PROCEDURE — 87880 STREP A ASSAY W/OPTIC: CPT | Performed by: PHYSICIAN ASSISTANT

## 2024-12-18 PROCEDURE — 87502 INFLUENZA DNA AMP PROBE: CPT | Performed by: PHYSICIAN ASSISTANT

## 2024-12-18 RX ORDER — DEXAMETHASONE 4 MG/1
4 TABLET ORAL ONCE
Status: COMPLETED | OUTPATIENT
Start: 2024-12-18 | End: 2024-12-18

## 2024-12-18 RX ORDER — IBUPROFEN 600 MG/1
600 TABLET, FILM COATED ORAL ONCE
Status: COMPLETED | OUTPATIENT
Start: 2024-12-18 | End: 2024-12-18

## 2024-12-18 NOTE — DISCHARGE INSTRUCTIONS
Please return to the ER/clinic if symptoms worsen. Follow-up with your PCP in 24-48 hours as needed.    The decadron will your system the next several days.  Take Motrin and/or Tylenol for fever and pain.  Recommend taking an over the counter antihistamine daily: IE zyrtec/claritin.  Sleep more upright. Use chloraseptic spray to help stop the cough trigger reflex.  Push fluids and gargle with warm saline rinses.   If symptoms persist or worsen i.e. increasing fevers or shortness of breath head to the emergency room.  Otherwise close follow-up with your PCP for further evaluation and treatment.

## 2024-12-18 NOTE — ED PROVIDER NOTES
Patient Seen in: [facility]      History     Chief Complaint   Patient presents with    Cough/URI    Body ache and/or chills    Headache     Stated Complaint: Headache; body aches; cough; chills    Subjective:   HPI      38 yo female here with a several day history of headache body aches a productive cough and chills.  Patient denies chest pain, shortness of breath, abdominal pain, nausea, vomiting or diarrhea.  Patient has been exposed to pneumonia.  Patient is tolerating p.o. speaking full sentences.  Afebrile.    Objective:     Past Medical History:    Esophageal reflux    History of trichomoniasis    Marijuana use    Obesity, Class III, BMI 40-49.9 (morbid obesity) ([facility])    Smoker              Past Surgical History:   Procedure Laterality Date    Cholecystectomy          D & c      x3          3    Oophorectomy Right     Other surgical history      2013 RT ovary removed after               The patient's medication list, past medical history and social history elements  as listed in today's nurse's notes are reviewed and agree.   The patient's family history is reviewed and is noncontributory to the presenting problem, except as indicated as above.     Social History     Socioeconomic History    Marital status: Single   Tobacco Use    Smoking status: Every Day     Current packs/day: 0.25     Average packs/day: 0.3 packs/day for 13.0 years (3.3 ttl pk-yrs)     Types: Cigarettes    Smokeless tobacco: Never    Tobacco comments:     3-4 cigarettes a day    Vaping Use    Vaping status: Never Used   Substance and Sexual Activity    Alcohol use: Not Currently     Comment: Occas.    Drug use: Not Currently     Frequency: 7.0 times per week     Types: Cannabis     Comment: occassionally per pt      Social Drivers of Health      Received from [employer], [employer]    [facility]              Review of Systems    Positive for stated complaint: Headache; body aches; cough;  chills  Other systems are as noted in HPI.  Constitutional and vital signs reviewed.      All other systems reviewed and negative except as noted above.    Physical Exam     ED Triage Vitals [12/18/24 0846]   /89   Pulse 86   Resp 22   Temp 99.5 °F (37.5 °C)   Temp src Oral   SpO2 98 %   O2 Device None (Room air)       Current Vitals:   Vital Signs  BP: 146/89  Pulse: 86  Resp: 22  Temp: 99.5 °F (37.5 °C)  Temp src: Oral    Oxygen Therapy  SpO2: 98 %  O2 Device: None (Room air)        Physical Exam  Vitals and nursing note reviewed.   Constitutional:       Appearance: Normal appearance. She is well-developed.   HENT:      Head: Normocephalic.      Jaw: There is normal jaw occlusion.      Right Ear: External ear normal. Tympanic membrane is bulging.      Left Ear: External ear normal. Tympanic membrane is bulging.      Nose: Mucosal edema, congestion and rhinorrhea present. Rhinorrhea is clear.      Mouth/Throat:      Lips: Pink.      Mouth: Mucous membranes are moist.      Pharynx: Oropharynx is clear. Posterior oropharyngeal erythema present.      Comments: Uvula midline:no trismus or drooling: No peritonsillar abscess noted moderate cobblestoning the posterior pharynx.  Eyes:      Conjunctiva/sclera: Conjunctivae normal.      Pupils: Pupils are equal, round, and reactive to light.   Cardiovascular:      Rate and Rhythm: Normal rate and regular rhythm.      Heart sounds: Normal heart sounds.   Pulmonary:      Effort: Pulmonary effort is normal.      Breath sounds: Wheezing and rhonchi present.      Comments: Mild expiratory wheeze  Musculoskeletal:      Cervical back: Normal range of motion and neck supple.   Skin:     General: Skin is warm.   Neurological:      Mental Status: She is alert and oriented to person, place, and time.   Psychiatric:         Behavior: Behavior normal.         Thought Content: Thought content normal.         Judgment: Judgment normal.           ED Course     Labs Reviewed   POCT FLU  TEST - Abnormal; Notable for the following components:       Result Value    POCT INFLUENZA A Positive (*)     All other components within normal limits    Narrative:     This assay is a rapid molecular in vitro test utilizing nucleic acid amplification of influenza A and B viral RNA.   POCT RAPID STREP - Normal   RAPID SARS-COV-2 BY PCR - Normal   I personally reviewed the xray images and and saw these findings: no pneumonia  XR CHEST PA + LAT CHEST (CPT=71046)    Result Date: 12/18/2024  PROCEDURE:  XR CHEST PA + LAT CHEST (CPT=71046)  INDICATIONS:  Headache; body aches; cough; chills  COMPARISON:  None.  TECHNIQUE:  PA and lateral chest radiographs were obtained.  PATIENT STATED HISTORY: (As transcribed by Technologist)  Congestion ,coughing and headache.    FINDINGS:  No focal consolidation.  No pleural effusion.  No pneumothorax.  No pulmonary edema.  The cardiomediastinal silhouette is within normal limits.  No acute osseous findings.  Mild degenerative changes of the spine.            CONCLUSION:  No acute cardiopulmonary findings.   LOCATION:  [facility]   Dictated by (CST): Raudel Aponte MD on 12/18/2024 at 9:18 AM     Finalized by (CST): Raudel Aponte MD on 12/18/2024 at 9:19 AM                   MDM   Clinical Impression: Flu A/PND/wheezing/persistent cough  Course of Treatment:   The decadron will your system the next several days.  Take Motrin and/or Tylenol for fever and pain.  Recommend taking an over the counter antihistamine daily: IE zyrtec/claritin.  Sleep more upright. Use chloraseptic spray to help stop the cough trigger reflex.  Push fluids and gargle with warm saline rinses.   If symptoms persist or worsen i.e. increasing fevers or shortness of breath head to the emergency room.  Otherwise close follow-up with your PCP for further evaluation and treatment.      The patient is encouraged to return if any concerning symptoms arise. Additional verbal discharge instructions are given and discussed.  Discharge medications are discussed. The patient is in good condition throughout the visit today and remains so upon discharge. I discuss the plan of care with the patient, who expresses understanding. All questions and concerns are addressed to the patient's satisfaction prior to discharge today.  Previous conversations with PCP and charts were reviewed.                Disposition and Plan     Clinical Impression:  1. Persistent cough    2. Influenza A         Disposition:  Discharge  12/18/2024 10:07 am    Follow-up:  Kenney Blood MD  [address] [place] [id number]  [phone]                Medications Prescribed:  Current Discharge Medication List              Supplementary Documentation:

## 2025-04-30 ENCOUNTER — TELEPHONE (OUTPATIENT)
Dept: BARIATRICS/WEIGHT MGMT | Age: 40
End: 2025-04-30

## 2025-05-03 DIAGNOSIS — E66.01 MORBID OBESITY WITH BMI OF 45.0-49.9, ADULT (HCC): ICD-10-CM

## 2025-05-05 RX ORDER — PHENTERMINE HYDROCHLORIDE 15 MG/1
15 CAPSULE ORAL EVERY MORNING
Qty: 30 CAPSULE | Refills: 0 | OUTPATIENT
Start: 2025-05-05

## (undated) DEVICE — UNDYED BRAIDED (POLYGLACTIN 910), SYNTHETIC ABSORBABLE SUTURE: Brand: COATED VICRYL

## (undated) DEVICE — DRAPE SHEET LAVH 124X112X30

## (undated) DEVICE — SUTURE VICRYL 0 UR-6

## (undated) DEVICE — [HIGH FLOW INSUFFLATOR,  DO NOT USE IF PACKAGE IS DAMAGED,  KEEP DRY,  KEEP AWAY FROM SUNLIGHT,  PROTECT FROM HEAT AND RADIOACTIVE SOURCES.]: Brand: PNEUMOSURE

## (undated) DEVICE — SOL  .9 1000ML BTL

## (undated) DEVICE — SUTURE VICRYL 0 J906G

## (undated) DEVICE — TROCAR: Brand: KII® SLEEVE

## (undated) DEVICE — INSUFFLATION NEEDLE TO ESTABLISH PNEUMOPERITONEUM.: Brand: INSUFFLATION NEEDLE

## (undated) DEVICE — 9534HP TRANSPARENT DRSG W/FRAME: Brand: 3M™ TEGADERM™

## (undated) DEVICE — 3M™ STERI-DRAPE™ INSTRUMENT POUCH 1018L: Brand: STERI-DRAPE™

## (undated) DEVICE — LAPAROSCOPY: Brand: MEDLINE INDUSTRIES, INC.

## (undated) DEVICE — TROCAR: Brand: KII SLEEVE

## (undated) DEVICE — 3M™ TEGADERM™ TRANSPARENT FILM DRESSING, 1626W, 4 IN X 4-3/4 IN (10 CM X 12 CM), 50 EACH/CARTON, 4 CARTON/CASE: Brand: 3M™ TEGADERM™

## (undated) DEVICE — GAMMEX® PI HYBRID SIZE 6.5, STERILE POWDER-FREE SURGICAL GLOVE, POLYISOPRENE AND NEOPRENE BLEND: Brand: GAMMEX

## (undated) DEVICE — TROCAR: Brand: KII FIOS FIRST ENTRY

## (undated) NOTE — LETTER
Date & Time: 7/11/2023, 11:33 AM  Patient: Ngozi Alcocer  Encounter Provider(s):    JESS Mix       To Whom It May Concern:    Jihan Pryor was seen and treated in our department on 7/11/2023. Will return to work tomorrow if feeling better and fever free.   Please accommodate her in the future if she needs to miss additional days to follow-up with a specialist.    If you have any questions or concerns, please do not hesitate to call.        _____________________________  Physician/APC Signature

## (undated) NOTE — Clinical Note
IUP at 20w1d  Level II ultrasound with ventriculomegaly and pyelectasis; suboptimal views of the feet and left hand, small nasal bone  Advanced maternal age, low risk [place] screen.   Invasive testing declined  Class III obesity complicating pregnancy

## (undated) NOTE — LETTER
4/5/2022          To Whom It May Concern:    Ismael Alcocer is currently under my medical care. Please excuse the patient from work missed until further notice as the patient has been conjunctivitis   May return to work when better and sufficiently recovered. If you require additional information please contact our office.         Sincerely,    Ignacio Pringle MD          Document generated by:  Ignacio Pringle MD

## (undated) NOTE — LETTER
Date & Time: 4/1/2022, 8:41 AM  Patient: Petros Pel Leodan  Encounter Provider(s):    See, Bhakti Chappell PA-C       To Whom It May Concern:    Jordan Alcocer was seen and treated in our department on 4/1/2022. She should not return to work until 4/2/2022.     If you have any questions or concerns, please do not hesitate to call.        _____________________________  Physician/APC Signature

## (undated) NOTE — LETTER
7/16/2021            To Whom It May Concern:    Prabhjot Alcocer was seen in my office today. She may return to work without any restrictions.     Sincerely,    Say Roche MD  [address] [place] [address]

## (undated) NOTE — LETTER
4/6/2022          To Whom It May Concern:    Taryn Alcocer is currently under my medical care and may not return to work at this time. She is approved to return to work on Monday, April 11th, 2022. If you require additional information please contact our office.         Sincerely,    Zo Khanna MD          Document generated by:  Court Randall

## (undated) NOTE — Clinical Note
Continue care with Dr. Mark Combs  Weekly NST at 36 weeks  Monthly growth ultrasounds starting at 28 weeks  Level 2 ultrasound at 20 weeks  She was advised to limit gestational weight gain to 15 pounds or less  Referral for sleep study is advised

## (undated) NOTE — LETTER
Date & Time: 4/3/2022, 12:36 PM  Patient: Jenae Alcocer  Encounter Provider(s):    MICHELLE Garcia       To Whom It May Concern:    Kimi Alcocer was seen and treated in our department on 4/3/2022. She should not return to work until April 6, 2022.     If you have any questions or concerns, please do not hesitate to call.        _____________________________  Physician/APC Signature

## (undated) NOTE — LETTER
AGREEMENT FOR TREATMENT WITH Rh IMMUNE GLOBULIN      To:    Nurse (provider) and [address] [place] [address]     Date: January 28, 2021   Time: 1:00 PM  I authorize the administration of Rh Immune Globulin for    ethology (myself o

## (undated) NOTE — LETTER
July 21, 2017    Shelby Cox[address]      Dear Efrain Sood: The following are the results of your recent tests. Please review the list of test results.   Your result is the value on the left; we have also suppli Monocyte Absolute 0.4 0.0 - 1.0 K/UL   Eosinophil Absolute 0.2 0.0 - 0.7 K/UL   Basophil Absolute 0.1 0.0 - 0.2 K/UL     Labs all good except Vitamin D very low.   Recommend Vitamin D 50,000 U( prescription) weekly for 8 -12 weeks, and then 2000 U (OTC) joão

## (undated) NOTE — Clinical Note
Continue care with [facility]  Weekly NST at 36 weeks  Monthly growth ultrasound & BPP  She was advised to limit gestational weight gain   F/U post- echocardiogram with Dr. Asim Hubbard

## (undated) NOTE — LETTER
[facility]  Administration of Anesthesia  1. I, Leah Alcocer, or _________________________________ acting on her behalf, (Patient) (Dependent/Representative) request to receive anesthesia for my pending procedure/operation/treatment spinal bleeding, seizure, cardiac arrest and death. 7. AWARENESS: I understand that it is possible (but unlikely) to have explicit memory of events from the operating room while under general anesthesia.   8. ELECTROCONVULSIVE THERAPY PATIENTS: This consen signature below affirms that prior to the time of the procedure, I have explained to the patient and/or his/her guardian, the risks and benefits of undergoing anesthesia, as well as any reasonable alternatives.     __________________________________________

## (undated) NOTE — LETTER
7/19/2021                       To Whom it May Concern,     Leodan Moralez is a patient of mine and is under my care, please excuse the patient from work     from 8/23/2021 through 8/30/2021.  Pt may return to work on 8/31/2021 with the following

## (undated) NOTE — LETTER
Date & Time: 2/5/2023, 11:22 AM  Patient: Isaura Alcocer  Encounter Provider(s):    MICHELLE Dobson       To Whom It May Concern:    Nell Alcocer was seen and treated in our department on 2/5/2023. She should not return to work until 2/10/23.     If you have any questions or concerns, please do not hesitate to call.        _____________________________  Physician/APC Signature

## (undated) NOTE — LETTER
Date & Time: 3/15/2022, 7:55 PM  Patient: Ike Alcocer  Encounter Provider(s):    JESS Becerra       To Whom It May Concern:    Rio Live was seen and treated in our department on 3/15/2022. Patient will return to work on Friday if feeling better and fever free.     If you have any questions or concerns, please do not hesitate to call.        _____________________________  Physician/APC Signature

## (undated) NOTE — Clinical Note
Thanks for the referral  Will give her information for the surgeons at [facility]. Wants to have sleeve gastrectomy.  In the meantime will start Phentermine 15 mg  Can not afford Semiglutide at this time. I told her its $100 a month   Thanks  Jovani

## (undated) NOTE — LETTER
MINOR CASE LETTER      7/19/2021        Dear Crystal Joya,    Your are having a Laparoscopic Salpingectomy on Monday,08/23/21 at 7:30am at [facility].    Do not eat or drink anything (including water) after midnight the night before surgery.     A feel free to contact our office at  if you have any questions regarding these instructions or your procedure.       Sincerely,          Isabella Bhatt MD  [address] [place] [address], [address]  [facility]

## (undated) NOTE — LETTER
INSTRUCTIONS FOR PRE-SURGICAL   ANTIMICROBIAL BATH/SHOWER    Your doctor has recommended a pre-surgical CHG (chlorhexidine gluconate) shower/bath with Betasept (also sold as Hibiclens). It reduces bacteria that can potentially cause infection.   Betasept or contact [employer]. Store between 60-80 degrees F. Fabric Warning! CHG WILL STAIN YOUR FABRICS! Use with care around shower curtains, towels washcloths rugs and clothes. Wipe surfaces immediately if accidentally splashed.       Nakul Hillman

## (undated) NOTE — LETTER
Date & Time: 4/7/2019, 2:25 PM  Patient: Bernardo Alcocer  Encounter Provider(s):    Elizabeth Shabazz MD       To Whom It May Concern:    Coni Alcocer was seen and treated in our department on 4/7/2019. She can return to school ON 4/9/19.     If yo

## (undated) NOTE — LETTER
Date & Time: 7/26/2022, 10:32 AM  Patient: Vivian Alcocer  Encounter Provider(s):    MICHELLE Ledesma       To Whom It May Concern:    Daphnie Alcocer was seen and treated in our department on 7/26/2022. She should not return to work until Friday July 29, 2022.     If you have any questions or concerns, please do not hesitate to call.        _____________________________  Physician/APC Signature

## (undated) NOTE — LETTER
Date & Time: 4/3/2022, 12:35 PM  Patient: Og Alcocer  Encounter Provider(s):    MICHELLE Hines       To Whom It May Concern:    Alejandra Alcocer was seen and treated in our department on 4/3/2022. She should not return to work until April 5, 2022.     If you have any questions or concerns, please do not hesitate to call.        _____________________________  Physician/APC Signature

## (undated) NOTE — LETTER
If You Are Rh Negative – Routine Administration    If you’re Rh negative, ask your health care provider about getting treated with RhoGam or Rhophylac. Even if you miscarry or don’t deliver the baby, you will still need treatment.  The health of any baby yo directed      Location, date and time:  4/20/21 at 1:45 pm

## (undated) NOTE — LETTER
VACCINE ADMINISTRATION RECORD  PARENT / GUARDIAN APPROVAL  Date: 2021  Vaccine administered to: Samina Alcocer     : 1985    MRN: [id number]    A copy of the appropriate Centers for Disease Control and Prevention Vaccine Information statem

## (undated) NOTE — LETTER
VACCINE ADMINISTRATION RECORD  PARENT / GUARDIAN APPROVAL  Date: 2021  Vaccine administered to: Price Landau Higgs     : 1985    MRN: [id number]    A copy of the appropriate Centers for Disease Control and Prevention Vaccine Information statem

## (undated) NOTE — Clinical Note
IUP at 36w1d  Normal fetal growth and  testing  AMA: low-risk cell free fetal DNA, Amniocentesis - normal karyotype and CMA  Class III obesity complicating pregnancy     RECOMMENDATIONS:  Continue care with [facility]  Weekly NST  F/U post-beto

## (undated) NOTE — LETTER
If You Are Rh Negative – Non Routine Administration  If you’re Rh negative, ask your health care provider about getting treated with RhoGam or Rhophylac. Even if you miscarry or don’t deliver the baby, you will still need treatment.  The health of any baby o Blood test to check for blood type and Rh factor at an [address] [facility] ([facility] or [facility])  o RhoGam or Rhophylac injection same day as directed by your provider    Location, date and time:  1/28/2021    Aria Martell,